# Patient Record
Sex: FEMALE | Race: ASIAN | NOT HISPANIC OR LATINO | Employment: FULL TIME | URBAN - METROPOLITAN AREA
[De-identification: names, ages, dates, MRNs, and addresses within clinical notes are randomized per-mention and may not be internally consistent; named-entity substitution may affect disease eponyms.]

---

## 2022-07-11 ENCOUNTER — TRANSFERRED RECORDS (OUTPATIENT)
Dept: HEALTH INFORMATION MANAGEMENT | Facility: CLINIC | Age: 32
End: 2022-07-11

## 2023-08-30 ENCOUNTER — TRANSFERRED RECORDS (OUTPATIENT)
Dept: HEALTH INFORMATION MANAGEMENT | Facility: CLINIC | Age: 33
End: 2023-08-30

## 2023-08-31 ENCOUNTER — TRANSFERRED RECORDS (OUTPATIENT)
Dept: HEALTH INFORMATION MANAGEMENT | Facility: CLINIC | Age: 33
End: 2023-08-31

## 2023-08-31 ENCOUNTER — MEDICAL CORRESPONDENCE (OUTPATIENT)
Dept: HEALTH INFORMATION MANAGEMENT | Facility: CLINIC | Age: 33
End: 2023-08-31

## 2023-09-01 ENCOUNTER — TRANSCRIBE ORDERS (OUTPATIENT)
Dept: OTHER | Age: 33
End: 2023-09-01

## 2023-09-01 ENCOUNTER — HOSPITAL ENCOUNTER (EMERGENCY)
Facility: CLINIC | Age: 33
Discharge: HOME | End: 2023-09-02
Attending: FAMILY MEDICINE | Admitting: FAMILY MEDICINE
Payer: COMMERCIAL

## 2023-09-01 DIAGNOSIS — L27.0 ALLERGIC DRUG RASH: Primary | ICD-10-CM

## 2023-09-01 DIAGNOSIS — L50.9 URTICARIA: ICD-10-CM

## 2023-09-01 PROCEDURE — 80076 HEPATIC FUNCTION PANEL: CPT | Performed by: FAMILY MEDICINE

## 2023-09-01 PROCEDURE — 250N000009 HC RX 250: Performed by: FAMILY MEDICINE

## 2023-09-01 PROCEDURE — 99285 EMERGENCY DEPT VISIT HI MDM: CPT | Mod: 25

## 2023-09-01 PROCEDURE — 86140 C-REACTIVE PROTEIN: CPT | Performed by: FAMILY MEDICINE

## 2023-09-01 PROCEDURE — 250N000011 HC RX IP 250 OP 636: Mod: JZ | Performed by: FAMILY MEDICINE

## 2023-09-01 PROCEDURE — 36415 COLL VENOUS BLD VENIPUNCTURE: CPT | Performed by: FAMILY MEDICINE

## 2023-09-01 PROCEDURE — 96374 THER/PROPH/DIAG INJ IV PUSH: CPT

## 2023-09-01 RX ADMIN — EPINEPHRINE 0.3 MG: 1 INJECTION INTRAMUSCULAR; INTRAVENOUS; SUBCUTANEOUS at 23:34

## 2023-09-01 RX ADMIN — FAMOTIDINE 20 MG: 10 INJECTION INTRAVENOUS at 23:42

## 2023-09-01 ASSESSMENT — ENCOUNTER SYMPTOMS
COUGH: 0
COLOR CHANGE: 1
VOMITING: 0
NAUSEA: 0
ABDOMINAL PAIN: 0

## 2023-09-01 ASSESSMENT — ACTIVITIES OF DAILY LIVING (ADL): ADLS_ACUITY_SCORE: 35

## 2023-09-02 VITALS
WEIGHT: 102 LBS | SYSTOLIC BLOOD PRESSURE: 104 MMHG | TEMPERATURE: 98.5 F | HEIGHT: 61 IN | RESPIRATION RATE: 20 BRPM | BODY MASS INDEX: 19.26 KG/M2 | HEART RATE: 80 BPM | DIASTOLIC BLOOD PRESSURE: 59 MMHG | OXYGEN SATURATION: 97 %

## 2023-09-02 LAB
ALBUMIN SERPL-MCNC: 4.3 G/DL (ref 3.5–5)
ALP SERPL-CCNC: 65 U/L (ref 45–120)
ALT SERPL W P-5'-P-CCNC: 31 U/L (ref 0–45)
AST SERPL W P-5'-P-CCNC: 20 U/L (ref 0–40)
BILIRUB DIRECT SERPL-MCNC: 0.1 MG/DL
BILIRUB SERPL-MCNC: 0.5 MG/DL (ref 0–1)
C REACTIVE PROTEIN LHE: 0.8 MG/DL (ref 0–?)
PROT SERPL-MCNC: 8.7 G/DL (ref 6–8)

## 2023-09-02 RX ORDER — PREDNISONE 10 MG/1
TABLET ORAL
Qty: 16 TABLET | Refills: 0 | Status: SHIPPED | OUTPATIENT
Start: 2023-09-01 | End: 2023-09-15

## 2023-09-02 NOTE — DISCHARGE INSTRUCTIONS
Take cetirizine 10 mg twice a day    Increase prednisone taper to 50 mg daily for 2 days, then 30 mg daily for 3 days, then 20 mg daily for 3 days, then 10 mg daily for 3 days, then 5 mg daily for 2 days.  A new prescription will be sent to your pharmacy.    Start taking hydroxyzine 25gm or Benadryl 50mg every 6 hours for the next 24 hours and then as needed

## 2023-09-02 NOTE — ED TRIAGE NOTES
Pt has had rash with itching over most of her body. Symptoms became severe after a hepatitis vaccine she received on Tuesday. Was given Rx for hydroxyzine and prednisone which did not help much. Seen in clinic yesterday and given IM solu-medrol and started on cetirizine. Pt reports rash to face became worse again after dinner tonight. Has instructions to follow up with allergist, but has not been able to be seen in clinic yet.     Triage Assessment       Row Name 09/01/23 3189       Triage Assessment (Adult)    Airway WDL WDL       Respiratory WDL    Respiratory WDL WDL       Skin Circulation/Temperature WDL    Skin Circulation/Temperature WDL WDL       Cardiac WDL    Cardiac WDL WDL       Peripheral/Neurovascular WDL    Peripheral Neurovascular WDL WDL       Cognitive/Neuro/Behavioral WDL    Cognitive/Neuro/Behavioral WDL WDL

## 2023-09-02 NOTE — ED PROVIDER NOTES
EMERGENCY DEPARTMENT ENCOUNTER      NAME: Sandi High  AGE: 33 year old female  YOB: 1990  MRN: [id number]  EVALUATION DATE & TIME: 9/1/2023 10:56 PM    PCP: No primary care provider on file.    ED PROVIDER: Juan A Nichols M.D.    Chief Complaint   Patient presents with    Allergic Reaction       FINAL IMPRESSION:  1. Urticaria        ED COURSE & MEDICAL DECISION MAKING:    Pertinent Labs & Imaging studies independently interpreted by me. (See chart for details)  11:04 PM  Patient seen and examined, reviewed prior record from August 30 when patient was seen with similar symptoms, at that time and was prescribed hydroxyzine as well as prednisone 12-day taper, represented at that facility yesterday and cetirizine was added to prednisone and hydroxyzine.  Patient represents today with return of the rash.  On exam she has an erythematous and urticarial rash of the face and upper chest, no breathing difficulties, denies lip or tongue swelling.  No abdominal pain, nausea, or vomiting.  Patient was given Pepcid and epinephrine in the emergency department and plan on increasing prednisone dose from 30 mg to 50 mg daily, as well as increasing cetirizine to twice a day from once a day.  A follow-up for allergist has already been placed.  If patient needs further emergency department care over the weekend, encouraged her to go to [facility] or other facility with dermatology and allergy available.  12:26 AM labs independently interpreted by me patient with slight elevation in CRP, normal hepatic panel.  Rechecked, rashes resolved and she is feeling better, stable for discharge.    At the conclusion of the encounter I discussed the results of all of the tests and the disposition. The questions were answered. The patient or family acknowledged understanding and was agreeable with the care plan.       Medical Decision Making    History:  Supplemental history from: Friend  External Record(s)  reviewed: Documented in chart, if applicable.    Work Up:  Chart documentation includes differential considered and any EKGs or imaging independently interpreted by provider, where specified.  In additional to work up documented, I considered the following work up: Documented in chart, if applicable.    External consultation:  Discussion of management with another provider: Documented in chart, if applicable    Complicating factors:  Care impacted by chronic illness: N/A  Care affected by social determinants of health: Access to Affordable Health Care    Disposition considerations: Discharge. I recommended the patient continue their current prescription strength medication(s): see ED course/MDM. I considered admission, but discharged patient after significant clinical improvement.      MEDICATIONS GIVEN IN THE EMERGENCY:  Medications   famotidine (PEPCID) injection 20 mg (20 mg Intravenous $Given 9/1/23 5532)   EPINEPHrine (ADRENALIN) kit 0.3 mg (0.3 mg Intramuscular $Given 9/1/23 9302)       NEW PRESCRIPTIONS STARTED AT TODAY'S ER VISIT  New Prescriptions    PREDNISONE (DELTASONE) 10 MG TABLET    Take 5 tablets (50 mg) by mouth daily for 3 days, THEN 3 tablets (30 mg) daily for 3 days, THEN 2 tablets (20 mg) daily for 3 days, THEN 1 tablet (10 mg) daily for 3 days, THEN 0.5 tablets (5 mg) daily for 2 days.       =================================================================    HPI    Patient information was obtained from: Patient      Sandi High is a 33 year old female with no pertinent history who presents to this ED via walk-in for evaluation of allergic reaction.    Patient reports that three days ago in the afternoon, she received a hepatitis vaccine. Around midnight, she developed red itchy rash all over her body. She states that she was evaluated for her symptoms then and was prescribed Hydroxyzine and Prednisone, which she has been taking without relief. Yesterday, she woke up and noticed her symptoms  "were worse so she went to Urgent Care, where she was given IM Solumedrol and started on Cetrizine. She states that the Solumedrol did provide mild relief. This morning when she woke up, she states that all of her symptoms returned. After dinner tonight, she developed worsening redness to her face and notes associating burning sensation. Patient denies any known triggers. She states that she did try sleeping on her chair last night for a couple hours and did not notice worsening symptoms, however did notice it when she moved to her bed. Otherwise, she denies any cough, vomiting, nausea, and abdominal pain. Patient denies any significant medical history including diabetes and hypertension. She does not take medications regularly. No previous surgeries. She does not smoke. No other complaints at this time.     Erythematous urticarial rash of face, neck, and upper chest       REVIEW OF SYSTEMS   Review of Systems   Respiratory:  Negative for cough.    Gastrointestinal:  Negative for abdominal pain, nausea and vomiting.   Skin:  Positive for color change and rash.   All other systems reviewed and are negative.   All other systems reviewed and negative    PAST MEDICAL HISTORY:  History reviewed. No pertinent past medical history.    PAST SURGICAL HISTORY:  History reviewed. No pertinent surgical history.    CURRENT MEDICATIONS:    No current facility-administered medications for this encounter.     Current Outpatient Medications   Medication    predniSONE (DELTASONE) 10 MG tablet       ALLERGIES:  No Known Allergies    FAMILY HISTORY:  History reviewed. No pertinent family history.    SOCIAL HISTORY:        VITALS:  /55   Pulse 78   Temp 98.5  F (36.9  C) (Oral)   Resp 20   Ht 1.549 m (5' 1\")   Wt 46.3 kg (102 lb)   SpO2 99%   BMI 19.27 kg/m      PHYSICAL EXAM:  Physical Exam  Vitals and nursing note reviewed.   Constitutional:       Appearance: Normal appearance.   HENT:      Head: Normocephalic and " atraumatic.      Right Ear: External ear normal.      Left Ear: External ear normal.      Nose: Nose normal.      Mouth/Throat:      Mouth: Mucous membranes are moist.   Eyes:      Extraocular Movements: Extraocular movements intact.      Conjunctiva/sclera: Conjunctivae normal.      Pupils: Pupils are equal, round, and reactive to light.   Cardiovascular:      Rate and Rhythm: Normal rate and regular rhythm.   Pulmonary:      Effort: Pulmonary effort is normal.      Breath sounds: Normal breath sounds. No wheezing or rales.   Abdominal:      General: Abdomen is flat. There is no distension.      Palpations: Abdomen is soft.      Tenderness: There is no abdominal tenderness. There is no guarding.   Musculoskeletal:         General: Normal range of motion.      Cervical back: Normal range of motion and neck supple.      Right lower leg: No edema.      Left lower leg: No edema.   Lymphadenopathy:      Cervical: No cervical adenopathy.   Skin:     General: Skin is warm and dry.      Comments: Erythematous urticarial rash of face, neck, and upper chest    Neurological:      General: No focal deficit present.      Mental Status: She is alert and oriented to person, place, and time. Mental status is at baseline.      Comments: No gross focal neurologic deficits   Psychiatric:         Mood and Affect: Mood normal.         Behavior: Behavior normal.         Thought Content: Thought content normal.         LAB:  All pertinent labs reviewed and interpreted.  Results for orders placed or performed during the hospital encounter of 09/01/23   CRP inflammation   Result Value Ref Range    CRP 0.8 (H) 0.0 - <0.8 mg/dL   Hepatic function panel   Result Value Ref Range    Bilirubin Total 0.5 0.0 - 1.0 mg/dL    Bilirubin Direct 0.1 <=0.5 mg/dL    Protein Total 8.7 (H) 6.0 - 8.0 g/dL    Albumin 4.3 3.5 - 5.0 g/dL    Alkaline Phosphatase 65 45 - 120 U/L    AST 20 0 - 40 U/L    ALT 31 0 - 45 U/L       RADIOLOGY:  Reviewed all pertinent  imaging. Please see official radiology report.  No orders to display       I, Sarah Horvath, am serving as a scribe to document services personally performed by Dr. Nichols based on my observation and the provider's statements to me. I, Juan A Nichols MD attest that Sarah Horvath is acting in a scribe capacity, has observed my performance of the services and has documented them in accordance with my direction.    Juan A Nichols M.D.  Emergency Medicine  [facility] EMERGENCY ROOM  [address] [place] [id number] [phone]  Dept: [phone]       Juan A Nichols MD  09/02/23 0027       Juan A Nichols MD  09/02/23 0031

## 2023-09-05 ENCOUNTER — TELEPHONE (OUTPATIENT)
Dept: DERMATOLOGY | Facility: CLINIC | Age: 33
End: 2023-09-05
Payer: COMMERCIAL

## 2023-09-05 NOTE — TELEPHONE ENCOUNTER
This encounter is being sent to inform the clinic that this patient has a referral from  for the diagnoses ofUrticaria [L50.9]  and has requested that this patient be seen within 3-5 days and/or with [facility]  Based on the availability of our provider(s), we are unable to accommodate this request.    Were all sites offered this patient?  Yes    Does scheduling algorithm request to schedule next available?  Patient has been scheduled for the first available opening with [facility] on 3/27.  We have informed the patient that the clinic will review their referral and reach out if a sooner appointment is medically necessary.

## 2023-09-11 ENCOUNTER — TELEPHONE (OUTPATIENT)
Dept: ALLERGY | Facility: CLINIC | Age: 33
End: 2023-09-11
Payer: COMMERCIAL

## 2023-09-11 NOTE — TELEPHONE ENCOUNTER
[facility] Call Center    Phone Message    May a detailed message be left on voicemail: yes     Reason for Call: Other: Pt states she is returning a call from 9/7 to schedule for an urgent 3 - 5 day referral. See TE dated 9/5/23.     Please call back at [phone]/ Thank you.     Action Taken: Message routed to:  [facility]): Allergy    Travel Screening: Not Applicable

## 2023-09-14 NOTE — TELEPHONE ENCOUNTER
Called and left message to assist with sooner appt. Also suggested signing up for OwlTing ???t to make communicating easier potentially. Number provided to call back

## 2023-09-16 NOTE — TELEPHONE ENCOUNTER
FUTURE VISIT INFORMATION      FUTURE VISIT INFORMATION:  Date: 9.26.23  Time: 7:30  Location: [facility]  REFERRAL INFORMATION:  Referring provider:  Simone  Referring providers clinic:  [facility]  Reason for visit/diagnosis  Urticaria [L50.9] / ref by Juan A Nichols / recs in [facility] / Urgent: 3-5 Days / per Pt.,      RECORDS REQUESTED FROM:       Clinic name Comments Records Status Imaging Status   [facility] 9.1.23  [facility]one [facility] 8.31.23  [facility]    8.30.23  Sherly Received  In [facility]

## 2023-09-26 ENCOUNTER — OFFICE VISIT (OUTPATIENT)
Dept: ALLERGY | Facility: CLINIC | Age: 33
End: 2023-09-26
Attending: FAMILY MEDICINE
Payer: COMMERCIAL

## 2023-09-26 ENCOUNTER — PRE VISIT (OUTPATIENT)
Dept: ALLERGY | Facility: CLINIC | Age: 33
End: 2023-09-26

## 2023-09-26 DIAGNOSIS — H10.10 SEASONAL AND PERENNIAL ALLERGIC RHINOCONJUNCTIVITIS: ICD-10-CM

## 2023-09-26 DIAGNOSIS — J30.89 SEASONAL AND PERENNIAL ALLERGIC RHINOCONJUNCTIVITIS: ICD-10-CM

## 2023-09-26 DIAGNOSIS — Z88.9 ATOPY: ICD-10-CM

## 2023-09-26 DIAGNOSIS — Z91.09 HOUSE DUST MITE ALLERGY: Primary | ICD-10-CM

## 2023-09-26 DIAGNOSIS — L50.9 URTICARIA: ICD-10-CM

## 2023-09-26 DIAGNOSIS — J30.2 SEASONAL AND PERENNIAL ALLERGIC RHINOCONJUNCTIVITIS: ICD-10-CM

## 2023-09-26 PROCEDURE — 99203 OFFICE O/P NEW LOW 30 MIN: CPT | Mod: 25 | Performed by: DERMATOLOGY

## 2023-09-26 PROCEDURE — 95004 PERQ TESTS W/ALRGNC XTRCS: CPT | Mod: GC | Performed by: DERMATOLOGY

## 2023-09-26 RX ORDER — FEXOFENADINE HCL 180 MG/1
180 TABLET ORAL DAILY
Qty: 60 TABLET | Refills: 2 | Status: SHIPPED | OUTPATIENT
Start: 2023-09-26 | End: 2024-03-08

## 2023-09-26 NOTE — PROGRESS NOTES
[facility] Dermato-allergology Note  Office visit  Encounter Date: Sep 26, 2023  ____________________________________________    CC: Allergy Consult (Per pt, intermittent full body hives starting around 8/23, may be correlated with conclusion of monthly menstrual cycle. Went to ER at last occurrence, per pt prescribed short-term cetirizine, prednisone and hydroxyzine which resolved issue, no longer taking.)      HPI:  (Sep 26, 2023)  Ms. Sandi High is a(n) 33 year old female who presents today as new patient for allergy consultation  - 8/29th received HepatitisA vaccine in the afternoon and starting getting the symptoms at midnight, itchy, red, goes to the face and legs it lasted for 1 week.  8/31- prescribed medication of prednisone, then [place] ER given IM injection of solumedrol and prednisone 30 mg hydroxyzine prn and cetirizine didn't help.  9/1 [facility]  went to ED - ER docs increased the dosage and didn't help and Epinephrine shots and symptoms started coming down (redness faded away)   9/5 - Symptoms disappeared.    Of note 8/20 - was in [place] and started to get hives on the legs and gotten Benadryl.   Redness came back and still feels pruritus on the body.     Did not take any NSAIDs, Aleve,       - otherwise feeling well in usual state of health    Physical exam:  General: In no acute distress, well-developed, well-nourished  Eyes: no conjunctivitis  ENT: no signs of rhinitis   Pulmonary: no wheezing or coughing  Skin: Focused examination of the skin on test sites was performed = see test results below  No evidence of disease on exam today.     Past Medical History:   There is no problem list on file for this patient.    History reviewed. No pertinent past medical history.    Allergies:  No Known Allergies    Medications:  Current Outpatient Medications   Medication    fexofenadine (ALLEGRA ALLERGY) 180 MG tablet     No current facility-administered medications for this  visit.       Social History:  The patient is a student. ph. d psychology student photography. Patient has the following hobbies or non-occupational exposure hiking, photography.     Family History:  History reviewed. No pertinent family history.    Previous Labs, Allergy Tests, Dermatopathology, Imaging:  ED notes 09/01/2023    Patient reports that three days ago in the afternoon, she received a hepatitis vaccine. Around midnight, she developed red itchy rash all over her body. She states that she was evaluated for her symptoms then and was prescribed Hydroxyzine and Prednisone, which she has been taking without relief. Yesterday, she woke up and noticed her symptoms were worse so she went to Urgent Care, where she was given IM Solumedrol and started on Cetrizine. She states that the Solumedrol did provide mild relief. This morning when she woke up, she states that all of her symptoms returned. After dinner tonight, she developed worsening redness to her face and notes associating burning sensation. Patient denies any known triggers. She states that she did try sleeping on her chair last night for a couple hours and did not notice worsening symptoms, however did notice it when she moved to her bed. Otherwise, she denies any cough, vomiting, nausea, and abdominal pain. Patient denies any significant medical history including diabetes and hypertension. She does not take medications regularly. No previous surgeries. She does not smoke. No other complaints at this time.      Erythematous urticarial rash of face, neck, and upper chest     Referred By: Juan A Nichols MD  [address] [place] [address]     Allergy Tests:    Past Allergy Test    Order for Future Allergy Testing:    [] Outpatient  [] Inpatient: [facility]..../ Bed ....       Skin Atopy (atopic dermatitis) [] Yes   [x] No .........  Contact allergies:   [] Yes   [x] No ..........  Hand eczema:   [] Yes   [x] No           Leading hand:   [] R   [] L        [] Ambidextrous         Drug allergies:        [] Yes   [x] No  which?......    Urticaria/Angioedema  [x] Yes   [] No .........  Food Allergy:  [] Yes   [x] No  which?......  Pets :  [x] Yes   [] No  which?......cats         [x]  Rhinitis   [x] Conjunctivitis   [] Sinusitis   [] Polyposis   [] Otitis   [] Pharyngitis         []  Postnasal drip    []  none  Operations:   [] Tonsils   [] Septum   [] Sinus   [] Polyposis        [] Asthma bronchiale   [] Coughing      []  none  Symptoms (mostly Rhinoconjunctivitis and Asthma) aggravated by:  Season   [] I   [x] II   [x] III   [] IV   []V   []VI   []VII   []VIII   []IX   []X   []XI   []XII     [] perennial   Day time      [] morning   [] noon      [] evening        [] night    [x] whole day........  []  none  Location/changes    [] inside        [x] outside   [] mountains    [] sea     [] others.............   []  none  Triggers, specific     [] animals     [x] plants     [] dust              [] others ...........................    []  none  Triggers, others       [] work          [] psyche    [] sport            [] others .............................  []  none  Irritant                [] phys efforts [] smoke    [] heat/cold     [] odors  []others............... []  none    Order for PATCH TESTS  Reason for tests (suspected allergy): not necessary   Known previous allergies: n/a  Standardized panels  [] Standard panel (40 tests)  [] Preservatives & Antimicrobials (31 tests)  [] Emulsifiers & Additives (25 tests)   [] Perfumes/Flavours & Plants (25 tests)  [] Hairdresser panel (12 tests)  [] Rubber Chemicals (22 tests)  [] Plastics (26 tests)  [] Colorants/Dyes/Food additives (20 tests)  [] Metals (implants/dental) (24 tests)  [] Local anaesthetics/NSAIDs (13 tests)  [] Antibiotics & Antimycotics (14 tests)   [] Corticosteroids (15 tests)   [] Photopatch test (62 tests)   [] others: ...      [] Patient's own products: ...  DO NOT test if chemical or biological identity  is unknown!   always ask from patient the product information and safety sheets (MSDS)       Order for PRICK TESTS    Reason for tests (suspected allergy): seasonal allergies  Known previous allergies: seasonal in spring in [place]    Standardized prick panels  [x] Atopic panel (20 tests)  [] Pediatric Panel (12 tests)  [] Milk, Meat, Eggs, Grains (20 tests)   [] Dust, Epithelia, Feathers (10 tests)  [] Fish, Seafood, Shellfish (17 tests)  [] Nuts, Beans (14 tests)  [] Spice, Vegetable, Fruit (17 tests)  [] Pollen Panel = Tree, Grass, Weed (24 tests)  [] Others: ...      [] Patient's own products: ...  DO NOT test if chemical or biological identity is unknown!   always ask from patient the product information and safety sheets (MSDS)     Standardized intradermal tests  [] Penicillium notatum [] Aspergillus fumigatus [] House dust mites D.far & D. pteron  [] Cat    [] dog  [] Others: ...  [] Bee venom   [] Wasp venom  !!Specific protocol with dilutions!!       Order for Drug allergy tests (prick & Intradermal &  patch tests)    [] Penicillin G  [] Ampicillin [] Cefazolin   [] Ceftriaxone   [] Ceftazidime  [] Bactrim    [] Others: ...  Order for ... as test date    Atopy Screen (Placed Sep 26, 2023)  No Substance Readings (15 min) Evaluation   POS Histamine 1mg/ml ++    NEG NaCl 0.9% -      No Substance Readings (15 min) Evaluation   1 Alternaria alternata (tenuis)  -    2 Cladosporium herbarum -    3 Aspergillus fumigatus -    4 Penicillium notatum -    5 Dermatophagoides pteronyssinus +/++    6 Dermatophagoides farinae +    7 Dog epithelium (canis spp) -    8 Cat hair (leno catus) -    9 Cockroach   (Blatella americana & germanica) +++    10 Grass mix midwest   (Perla, Orchard, Redtop, Júnior) -    11 James grass (sorghum halepense) -    12 Weed mix   (common Cocklebur, Lamb s quarters, rough redroot Pigweed, Dock/Sorrel) +    13 Mug wort (artemisia vulgare) -    14 Ragweed giant/short (ambrosia spp) -    15 White  birch (Betula papyrifera) -    16 Tree mix 1 (Pecan, Maple BHR, Oak RVW, american Sheldon, black Alva) -    17 Red cedar (juniperus virginia) -    18 Tree mix 2   (white Kaleb, river/red Birch, black [place], common [place], american Elm) -    19 Box elder/Maple mix (acer spp) -    20 [place] shagbark (carya ovata) -           Conclusion: dust mite and cockroach sensitization     ________________________________    Assessment & Plan:    ==> Final Diagnosis:     #Subacute urticaria s/p vaccine   No signs for specific allergy to the vaccine   More likely non-specific = para-inflammatory reaction  - Allegra 60 tablets refill x 2       #Suspicion for Atopic Predisposition   Seasonal Rhinoconjunctivitis in Spring in [place]  sensitization to house dust mites    These conclusions are made at the best of one's knowledge and belief based on the provided evidence such as patient's history and allergy test results and they can change over time or can be incomplete because of missing information's.    ==> Treatment Plan:  Atopic prick panel  Fexofenadine 180mg BID with instructions for pretreatment before vaccines    Procedures Performed: Allergy tests, including prick tests    Staff and Resident:  Provider    Eloy Jack DO, MS  PGY-2  Dermatology  [facility]  Sep 26, 2023 9:14 AM    Staff Physician Comments:  I saw and evaluated the patient with the resident and I agree with the assessment and plan as documented in the resident's note.    Cisco Merida MD  Professor  Head of Dermato-Allergy Division  Department of Dermatology  [facility]     Follow-up in Derm-Allergy clinic 3/27/24    I spent a total of 38 minutes with Sandi High. This time was spent counseling the patient and/or coordinating care, explaining the allergy tests, performing allergy tests and assessing the clinical relevance.

## 2023-09-26 NOTE — PATIENT INSTRUCTIONS
allegra 180 mg take 1-2 allegra a day for 1-2 weeks  If you get a vaccine again start allegra 2 days before the vaccine, during the vaccine administration, and after for 2 days.     House Dust Mite Allergy        The house dust mite is an arachnid about 0.3 mm in size and not visible to the naked eye. There are around 150 species of house dust mites in the world. One mite produces up to 40 fecal droppings a day. One teaspoonful of bedroom dust contains an average of nearly 1000 mites and 250,000 minute droppings.    Causes and triggers of house dust mite allergy  The house dust mite requires a warm, moist environment without light in order to live and reproduce. Our beds are ideal. The mite feeds on human and animal skin scales. The allergen is mainly contained in the mite's feces. The feces contain allergy-triggering constituents which are spread in fine dust, are breathed in and can cause an allergic reaction.    Symptoms  When the allergens come into contact with the mucous membranes in the eyes, nose, mouth and throat, sufferers develop symptoms typical of an allergic cold (allergic rhinitis) or an allergic inflammation of the conjunctiva (allergic conjunctivitis): blocked or runny nose, sneezing, red, itchy eyes. If all of these symptoms are present, then the condition is also known as rhinoconjunctivitis. Often, the upper respiratory tract becomes chronically inflamed, primarily because house dust mites are present all year round.  The symptoms of house dust mite allergy typically occur in the morning and are more frequent in the cold months of the year.    Therapy and treatment  As a first step, mattress, pillows and duvet/comforter should be placed in mite-proof or anti-mite covers, sometimes known as encasings. Alternatively you can use pillows or comforter that can be washed at over 130 F monthly. At the same time, house dust should be minimized. If necessary, the symptoms can be treated with medication,  "for example antihistamines in the form of nasal sprays, eye drops and tablets. Desensitization/specific immunotherapy (SIT) is recommended for house dust allergy if all the measures above are not sufficient.    Tips and tricks:  Keep room temperature at 66-70 F and relative air humidity at a maximum of 50%.  Ideally, thoroughly air your home two to three times a day for 5 to 10 minutes each time.  Wash bed linens in at least 130 F every week.  Remove stuffed animals or freeze them every other week.  Keep ceiling fans off in the bedroom as they can stir up dust mite allergens.  Remove dust from furniture with a damp cloth and regularly wet mop floors.  Do not put pot and hydroponic plants in the bedroom and also avoid putting too many in living areas, as they increase room humidity.  When staying overnight in other accommodations, we recommend taking your own bed linen and the above anti-mite mattress covers with you.  Remove upholstered furniture from the bedroom and consider removing the carpets. Ideally, use sealed parquet or laminate peggy, cork tiles or peggy made of wood, novilon or PVC.  Maybe additionally reduce dust mites in mattress, upholstery, or peggy using hot steam .      Modified from \"House Dust Mite Allergy\" by [employer] [facility].   "

## 2023-09-26 NOTE — NURSING NOTE
Chief Complaint   Patient presents with    Allergy Consult     Per pt, intermittent full body hives starting around 8/23, may be correlated with conclusion of monthly menstrual cycle. Went to ER at last occurrence, per pt prescribed short-term cetirizine, prednisone and hydroxyzine which resolved issue, no longer taking.    Brandon Mercedes, RN

## 2023-09-26 NOTE — LETTER
9/26/2023         RE: Sandi High  [address]  [facility] [address]        Dear Colleague,    Thank you for referring your patient, Sandi High, to the [facility] [place]. Please see a copy of my visit note below.    [facility] Dermato-allergology Note  Office visit  Encounter Date: Sep 26, 2023  ____________________________________________    CC: Allergy Consult (Per pt, intermittent full body hives starting around 8/23, may be correlated with conclusion of monthly menstrual cycle. Went to ER at last occurrence, per pt prescribed short-term cetirizine, prednisone and hydroxyzine which resolved issue, no longer taking.)      HPI:  (Sep 26, 2023)  Ms. Sandi High is a(n) 33 year old female who presents today as new patient for allergy consultation  - 8/29th received HepatitisA vaccine in the afternoon and starting getting the symptoms at midnight, itchy, red, goes to the face and legs it lasted for 1 week.  8/31- prescribed medication of prednisone, then [place] [facility] given IM injection of solumedrol and prednisone 30 mg hydroxyzine prn and cetirizine didn't help.  9/1 formerly [facility]  went to ED - ER docs increased the dosage and didn't help and Epinephrine shots and symptoms started coming down (redness faded away)   9/5 - Symptoms disappeared.    Of note 8/20 - was in [place] and started to get hives on the legs and gotten Benadryl.   Redness came back and still feels pruritus on the body.     Did not take any NSAIDs, Aleve,       - otherwise feeling well in usual state of health    Physical exam:  General: In no acute distress, well-developed, well-nourished  Eyes: no conjunctivitis  ENT: no signs of rhinitis   Pulmonary: no wheezing or coughing  Skin: Focused examination of the skin on test sites was performed = see test results below  No evidence of disease on exam today.     Past Medical History:   There is no problem list on file for this  patient.    History reviewed. No pertinent past medical history.    Allergies:  No Known Allergies    Medications:  Current Outpatient Medications   Medication     fexofenadine (ALLEGRA ALLERGY) 180 MG tablet     No current facility-administered medications for this visit.       Social History:  The patient is a student. ph. d psychology student photography. Patient has the following hobbies or non-occupational exposure hiking, photography.     Family History:  History reviewed. No pertinent family history.    Previous Labs, Allergy Tests, Dermatopathology, Imaging:  ED notes 09/01/2023    Patient reports that three days ago in the afternoon, she received a hepatitis vaccine. Around midnight, she developed red itchy rash all over her body. She states that she was evaluated for her symptoms then and was prescribed Hydroxyzine and Prednisone, which she has been taking without relief. Yesterday, she woke up and noticed her symptoms were worse so she went to Urgent Care, where she was given IM Solumedrol and started on Cetrizine. She states that the Solumedrol did provide mild relief. This morning when she woke up, she states that all of her symptoms returned. After dinner tonight, she developed worsening redness to her face and notes associating burning sensation. Patient denies any known triggers. She states that she did try sleeping on her chair last night for a couple hours and did not notice worsening symptoms, however did notice it when she moved to her bed. Otherwise, she denies any cough, vomiting, nausea, and abdominal pain. Patient denies any significant medical history including diabetes and hypertension. She does not take medications regularly. No previous surgeries. She does not smoke. No other complaints at this time.      Erythematous urticarial rash of face, neck, and upper chest     Referred By: Juan A Nichols MD  [address] [place] [address]     Allergy Tests:    Past Allergy Test    Order  for Future Allergy Testing:    [] Outpatient  [] Inpatient: [facility]..../ Bed ....       Skin Atopy (atopic dermatitis) [] Yes   [x] No .........  Contact allergies:   [] Yes   [x] No ..........  Hand eczema:   [] Yes   [x] No           Leading hand:   [] R   [] L       [] Ambidextrous         Drug allergies:        [] Yes   [x] No  which?......    Urticaria/Angioedema  [x] Yes   [] No .........  Food Allergy:  [] Yes   [x] No  which?......  Pets :  [x] Yes   [] No  which?......cats         [x]  Rhinitis   [x] Conjunctivitis   [] Sinusitis   [] Polyposis   [] Otitis   [] Pharyngitis         []  Postnasal drip    []  none  Operations:   [] Tonsils   [] Septum   [] Sinus   [] Polyposis        [] Asthma bronchiale   [] Coughing      []  none  Symptoms (mostly Rhinoconjunctivitis and Asthma) aggravated by:  Season   [] I   [x] II   [x] III   [] IV   []V   []VI   []VII   []VIII   []IX   []X   []XI   []XII     [] perennial   Day time      [] morning   [] noon      [] evening        [] night    [x] whole day........  []  none  Location/changes    [] inside        [x] outside   [] mountains    [] sea     [] others.............   []  none  Triggers, specific     [] animals     [x] plants     [] dust              [] others ...........................    []  none  Triggers, others       [] work          [] psyche    [] sport            [] others .............................  []  none  Irritant                [] phys efforts [] smoke    [] heat/cold     [] odors  []others............... []  none    Order for PATCH TESTS  Reason for tests (suspected allergy): not necessary   Known previous allergies: n/a  Standardized panels  [] Standard panel (40 tests)  [] Preservatives & Antimicrobials (31 tests)  [] Emulsifiers & Additives (25 tests)   [] Perfumes/Flavours & Plants (25 tests)  [] Hairdresser panel (12 tests)  [] Rubber Chemicals (22 tests)  [] Plastics (26 tests)  [] Colorants/Dyes/Food additives (20 tests)  [] Metals  (implants/dental) (24 tests)  [] Local anaesthetics/NSAIDs (13 tests)  [] Antibiotics & Antimycotics (14 tests)   [] Corticosteroids (15 tests)   [] Photopatch test (62 tests)   [] others: ...      [] Patient's own products: ...  DO NOT test if chemical or biological identity is unknown!   always ask from patient the product information and safety sheets (MSDS)       Order for PRICK TESTS    Reason for tests (suspected allergy): seasonal allergies  Known previous allergies: seasonal in spring in [place]    Standardized prick panels  [x] Atopic panel (20 tests)  [] Pediatric Panel (12 tests)  [] Milk, Meat, Eggs, Grains (20 tests)   [] Dust, Epithelia, Feathers (10 tests)  [] Fish, Seafood, Shellfish (17 tests)  [] Nuts, Beans (14 tests)  [] Spice, Vegetable, Fruit (17 tests)  [] Pollen Panel = Tree, Grass, Weed (24 tests)  [] Others: ...      [] Patient's own products: ...  DO NOT test if chemical or biological identity is unknown!   always ask from patient the product information and safety sheets (MSDS)     Standardized intradermal tests  [] Penicillium notatum [] Aspergillus fumigatus [] House dust mites D.far & D. pteron  [] Cat    [] dog  [] Others: ...  [] Bee venom   [] Wasp venom  !!Specific protocol with dilutions!!       Order for Drug allergy tests (prick & Intradermal &  patch tests)    [] Penicillin G  [] Ampicillin [] Cefazolin   [] Ceftriaxone   [] Ceftazidime  [] Bactrim    [] Others: ...  Order for ... as test date    Atopy Screen (Placed Sep 26, 2023)  No Substance Readings (15 min) Evaluation   POS Histamine 1mg/ml ++    NEG NaCl 0.9% -      No Substance Readings (15 min) Evaluation   1 Alternaria alternata (tenuis)  -    2 Cladosporium herbarum -    3 Aspergillus fumigatus -    4 Penicillium notatum -    5 Dermatophagoides pteronyssinus +/++    6 Dermatophagoides farinae +    7 Dog epithelium (canis spp) -    8 Cat hair (leno catus) -    9 Cockroach   (Blatella americana & germanica) +++    10 Grass  mix midwest   (Perla, Orchard, Redtop, Júnior) -    11 James grass (sorghum halepense) -    12 Weed mix   (common Cocklebur, Lamb s quarters, rough redroot Pigweed, Dock/Sorrel) +    13 Mug wort (artemisia vulgare) -    14 Ragweed giant/short (ambrosia spp) -    15 White birch (Betula papyrifera) -    16 Tree mix 1 (Pecan, Maple BHR, Oak RVW, american [place], black [place]) -    17 Red cedar (juniperus virginia) -    18 Tree mix 2   (white [place], river/red Birch, black [place], common [place], american Elm) -    19 Box elder/Maple mix (acer spp) -    20 Vining shagbark (carya ovata) -           Conclusion: dust mite and cockroach sensitization     ________________________________    Assessment & Plan:    ==> Final Diagnosis:     #Subacute urticaria s/p vaccine   No signs for specific allergy to the vaccine   More likely non-specific = para-inflammatory reaction  - Allegra 60 tablets refill x 2       #Suspicion for Atopic Predisposition   Seasonal Rhinoconjunctivitis in Spring in [place]  sensitization to house dust mites    These conclusions are made at the best of one's knowledge and belief based on the provided evidence such as patient's history and allergy test results and they can change over time or can be incomplete because of missing information's.    ==> Treatment Plan:  Atopic prick panel  Fexofenadine 180mg BID with instructions for pretreatment before vaccines    Procedures Performed: Allergy tests, including prick tests    Staff and Resident:  Provider    Eloy Jack DO, MS  PGY-2  Dermatology  [facility]  Sep 26, 2023 9:14 AM    Staff Physician Comments:  I saw and evaluated the patient with the resident and I agree with the assessment and plan as documented in the resident's note.    Cisco Merida MD  Professor  Head of Dermato-Allergy Division  Department of Dermatology  [facility]     Follow-up in Derm-Allergy clinic 3/27/24    I spent a total of  38 minutes with Sandi High. This time was spent counseling the patient and/or coordinating care, explaining the allergy tests, performing allergy tests and assessing the clinical relevance.         Again, thank you for allowing me to participate in the care of your patient.        Sincerely,        Cisco Merida MD

## 2023-09-26 NOTE — PROGRESS NOTES
[facility] Dermato-allergology Note  Office visit  Encounter Date: Sep 26, 2023  ____________________________________________    CC: No chief complaint on file.      HPI:  (Sep 26, 2023)  Ms. Sandi High is a(n) 33 year old female who presents today as new patient for allergy consultation  - ***  - otherwise feeling well in usual state of health    Physical exam:  General: In no acute distress, well-developed, well-nourished  Eyes: no conjunctivitis  ENT: no signs of rhinitis   Pulmonary: no wheezing or coughing  Skin: Focused examination of the skin on test sites was performed = see test results below  {Skin Exam:300430}    Past Medical History:   There is no problem list on file for this patient.    No past medical history on file.    Allergies:  No Known Allergies    Medications:  No current outpatient medications on file.     No current facility-administered medications for this visit.       Social History:  The patient {works:799578}. Patient has the following hobbies or non-occupational exposure ***    Family History:  No family history on file.    Previous Labs, Allergy Tests, Dermatopathology, Imaging:  ED notes 09/01/2023    Patient reports that three days ago in the afternoon, she received a hepatitis vaccine. Around midnight, she developed red itchy rash all over her body. She states that she was evaluated for her symptoms then and was prescribed Hydroxyzine and Prednisone, which she has been taking without relief. Yesterday, she woke up and noticed her symptoms were worse so she went to Urgent Care, where she was given IM Solumedrol and started on Cetrizine. She states that the Solumedrol did provide mild relief. This morning when she woke up, she states that all of her symptoms returned. After dinner tonight, she developed worsening redness to her face and notes associating burning sensation. Patient denies any known triggers. She states that she did try sleeping on her chair last  night for a couple hours and did not notice worsening symptoms, however did notice it when she moved to her bed. Otherwise, she denies any cough, vomiting, nausea, and abdominal pain. Patient denies any significant medical history including diabetes and hypertension. She does not take medications regularly. No previous surgeries. She does not smoke. No other complaints at this time.      Erythematous urticarial rash of face, neck, and upper chest       Referred By: Juan A Nichols MD  [address] [place] [address]     Allergy Tests:    Past Allergy Test    Order for Future Allergy Testing:    [] Outpatient  [] Inpatient: [facility]..../ Bed ....       Skin Atopy (atopic dermatitis) [] Yes   [] No .........  Contact allergies:   [] Yes   [] No ..........  Hand eczema:   [] Yes   [] No           Leading hand:   [] R   [] L       [] Ambidextrous         Drug allergies:        [] Yes   [] No  which?......    Urticaria/Angioedema  [] Yes   [] No .........  Food Allergy:  [] Yes   [] No  which?......  Pets :  [] Yes   [] No  which?......         []  Rhinitis   [] Conjunctivitis   [] Sinusitis   [] Polyposis   [] Otitis   [] Pharyngitis         []  Postnasal drip    []  none  Operations:   [] Tonsils   [] Septum   [] Sinus   [] Polyposis        [] Asthma bronchiale   [] Coughing      []  none  Symptoms (mostly Rhinoconjunctivitis and Asthma) aggravated by:  Season   [] I   [] II   [] III   [] IV   []V   []VI   []VII   []VIII   []IX   []X   []XI   []XII     [] perennial   Day time      [] morning   [] noon      [] evening        [] night    [] whole day........  []  none  Location/changes    [] inside        [] outside   [] mountains    [] sea     [] others.............   []  none  Triggers, specific     [] animals     [] plants     [] dust              [] others ...........................    []  none  Triggers, others       [] work          [] psyche    [] sport            [] others .............................  []   none  Irritant                [] phys efforts [] smoke    [] heat/cold     [] odors  []others............... []  none    Order for PATCH TESTS  Reason for tests (suspected allergy): ***  Known previous allergies: ***  Standardized panels  [] Standard panel (40 tests)  [] Preservatives & Antimicrobials (31 tests)  [] Emulsifiers & Additives (25 tests)   [] Perfumes/Flavours & Plants (25 tests)  [] Hairdresser panel (12 tests)  [] Rubber Chemicals (22 tests)  [] Plastics (26 tests)  [] Colorants/Dyes/Food additives (20 tests)  [] Metals (implants/dental) (24 tests)  [] Local anaesthetics/NSAIDs (13 tests)  [] Antibiotics & Antimycotics (14 tests)   [] Corticosteroids (15 tests)   [] Photopatch test (62 tests)   [] others: ...      [] Patient's own products: ...  DO NOT test if chemical or biological identity is unknown!   always ask from patient the product information and safety sheets (MSDS)       Order for PRICK TESTS    Reason for tests (suspected allergy): ***  Known previous allergies: ***    Standardized prick panels  [] Atopic panel (20 tests)  [] Pediatric Panel (12 tests)  [] Milk, Meat, Eggs, Grains (20 tests)   [] Dust, Epithelia, Feathers (10 tests)  [] Fish, Seafood, Shellfish (17 tests)  [] Nuts, Beans (14 tests)  [] Spice, Vegetable, Fruit (17 tests)  [] Pollen Panel = Tree, Grass, Weed (24 tests)  [] Others: ...      [] Patient's own products: ...  DO NOT test if chemical or biological identity is unknown!   always ask from patient the product information and safety sheets (MSDS)     Standardized intradermal tests  [] Penicillium notatum [] Aspergillus fumigatus [] House dust mites D.far & D. pteron  [] Cat    [] dog  [] Others: ...  [] Bee venom   [] Wasp venom  !!Specific protocol with dilutions!!       Order for Drug allergy tests (prick & Intradermal & *** patch tests)    [] Penicillin G  [] Ampicillin [] Cefazolin   [] Ceftriaxone   [] Ceftazidime  [] Bactrim    [] Others: ...  Order for ... as  test date    [] Patient needs consultation with Allergy team (changes of tests may apply)  [] Tests discussed with Allergy team (can have direct appointment for allergy tests)     ________________________________    Assessment & Plan:    ==> Final Diagnosis:     # ***  {jgstatus:366603}    # ***  {jgstatus:524592}    # ***  {jgstatus:990920}  {jgallergytestfinal:536572}  - ***    These conclusions are made at the best of one's knowledge and belief based on the provided evidence such as patient's history and allergy test results and they can change over time or can be incomplete because of missing information's.    ==> Treatment Plan:  ***    Procedures Performed: Allergy tests, including prick, intradermal and patch tests and drug allergy or provocation tests***    {kkstaffinvolved:777221} Provider    Follow-up in Derm-Allergy clinic ***    I spent a total of [5:10:15:20:25:30:35:40:45:50:55:60:***] minutes with Sandi High. This time was spent counseling the patient and/or coordinating care, explaining the allergy tests *** or procedures, performing allergy tests and assessing the clinical relevance.

## 2023-10-22 ENCOUNTER — HEALTH MAINTENANCE LETTER (OUTPATIENT)
Age: 33
End: 2023-10-22

## 2024-01-17 ENCOUNTER — ANCILLARY PROCEDURE (OUTPATIENT)
Dept: ULTRASOUND IMAGING | Facility: CLINIC | Age: 34
End: 2024-01-17
Attending: NURSE PRACTITIONER
Payer: COMMERCIAL

## 2024-01-17 DIAGNOSIS — T83.32XA IUD STRINGS LOST: ICD-10-CM

## 2024-01-17 PROCEDURE — 76856 US EXAM PELVIC COMPLETE: CPT | Mod: GC | Performed by: RADIOLOGY

## 2024-01-17 PROCEDURE — 76830 TRANSVAGINAL US NON-OB: CPT | Mod: GC | Performed by: RADIOLOGY

## 2024-03-08 ENCOUNTER — OFFICE VISIT (OUTPATIENT)
Dept: FAMILY MEDICINE | Facility: CLINIC | Age: 34
End: 2024-03-08
Payer: COMMERCIAL

## 2024-03-08 VITALS
OXYGEN SATURATION: 98 % | RESPIRATION RATE: 20 BRPM | SYSTOLIC BLOOD PRESSURE: 98 MMHG | HEART RATE: 74 BPM | BODY MASS INDEX: 20.46 KG/M2 | WEIGHT: 104.2 LBS | HEIGHT: 60 IN | DIASTOLIC BLOOD PRESSURE: 68 MMHG | TEMPERATURE: 99.3 F

## 2024-03-08 DIAGNOSIS — Z11.4 SCREENING FOR HIV (HUMAN IMMUNODEFICIENCY VIRUS): ICD-10-CM

## 2024-03-08 DIAGNOSIS — R10.2 SUPRAPUBIC PAIN: ICD-10-CM

## 2024-03-08 DIAGNOSIS — Z11.59 NEED FOR HEPATITIS C SCREENING TEST: ICD-10-CM

## 2024-03-08 DIAGNOSIS — Z97.5 IUD (INTRAUTERINE DEVICE) IN PLACE: ICD-10-CM

## 2024-03-08 DIAGNOSIS — Z12.4 CERVICAL CANCER SCREENING: ICD-10-CM

## 2024-03-08 DIAGNOSIS — R12 HEART BURN: ICD-10-CM

## 2024-03-08 DIAGNOSIS — R10.13 EPIGASTRIC PAIN: Primary | ICD-10-CM

## 2024-03-08 LAB
ALBUMIN UR-MCNC: NEGATIVE MG/DL
APPEARANCE UR: ABNORMAL
BACTERIA #/AREA URNS HPF: ABNORMAL /HPF
BASOPHILS # BLD AUTO: 0.1 10E3/UL (ref 0–0.2)
BASOPHILS NFR BLD AUTO: 1 %
BILIRUB UR QL STRIP: NEGATIVE
COLOR UR AUTO: YELLOW
EOSINOPHIL # BLD AUTO: 0.1 10E3/UL (ref 0–0.7)
EOSINOPHIL NFR BLD AUTO: 2 %
ERYTHROCYTE [DISTWIDTH] IN BLOOD BY AUTOMATED COUNT: 12.5 % (ref 10–15)
GLUCOSE UR STRIP-MCNC: NEGATIVE MG/DL
HCT VFR BLD AUTO: 38.8 % (ref 35–47)
HGB BLD-MCNC: 13.1 G/DL (ref 11.7–15.7)
HGB UR QL STRIP: ABNORMAL
IMM GRANULOCYTES # BLD: 0 10E3/UL
IMM GRANULOCYTES NFR BLD: 0 %
KETONES UR STRIP-MCNC: NEGATIVE MG/DL
LEUKOCYTE ESTERASE UR QL STRIP: NEGATIVE
LYMPHOCYTES # BLD AUTO: 1.5 10E3/UL (ref 0.8–5.3)
LYMPHOCYTES NFR BLD AUTO: 23 %
MCH RBC QN AUTO: 29.8 PG (ref 26.5–33)
MCHC RBC AUTO-ENTMCNC: 33.8 G/DL (ref 31.5–36.5)
MCV RBC AUTO: 88 FL (ref 78–100)
MONOCYTES # BLD AUTO: 0.7 10E3/UL (ref 0–1.3)
MONOCYTES NFR BLD AUTO: 10 %
NEUTROPHILS # BLD AUTO: 4.4 10E3/UL (ref 1.6–8.3)
NEUTROPHILS NFR BLD AUTO: 65 %
NITRATE UR QL: NEGATIVE
PH UR STRIP: 6 [PH] (ref 5–7)
PLATELET # BLD AUTO: 217 10E3/UL (ref 150–450)
RBC # BLD AUTO: 4.4 10E6/UL (ref 3.8–5.2)
RBC #/AREA URNS AUTO: ABNORMAL /HPF
SP GR UR STRIP: 1.02 (ref 1–1.03)
SQUAMOUS #/AREA URNS AUTO: ABNORMAL /LPF
UROBILINOGEN UR STRIP-ACNC: 0.2 E.U./DL
WBC # BLD AUTO: 6.8 10E3/UL (ref 4–11)
WBC #/AREA URNS AUTO: ABNORMAL /HPF

## 2024-03-08 PROCEDURE — 86364 TISS TRNSGLTMNASE EA IG CLAS: CPT | Performed by: FAMILY MEDICINE

## 2024-03-08 PROCEDURE — 36415 COLL VENOUS BLD VENIPUNCTURE: CPT | Performed by: FAMILY MEDICINE

## 2024-03-08 PROCEDURE — 99205 OFFICE O/P NEW HI 60 MIN: CPT | Performed by: FAMILY MEDICINE

## 2024-03-08 PROCEDURE — 84443 ASSAY THYROID STIM HORMONE: CPT | Performed by: FAMILY MEDICINE

## 2024-03-08 PROCEDURE — 81001 URINALYSIS AUTO W/SCOPE: CPT | Performed by: FAMILY MEDICINE

## 2024-03-08 PROCEDURE — 80053 COMPREHEN METABOLIC PANEL: CPT | Performed by: FAMILY MEDICINE

## 2024-03-08 PROCEDURE — 83690 ASSAY OF LIPASE: CPT | Performed by: FAMILY MEDICINE

## 2024-03-08 PROCEDURE — 86803 HEPATITIS C AB TEST: CPT | Performed by: FAMILY MEDICINE

## 2024-03-08 PROCEDURE — 85025 COMPLETE CBC W/AUTO DIFF WBC: CPT | Performed by: FAMILY MEDICINE

## 2024-03-08 PROCEDURE — 87389 HIV-1 AG W/HIV-1&-2 AB AG IA: CPT | Performed by: FAMILY MEDICINE

## 2024-03-08 RX ORDER — FAMOTIDINE 20 MG/1
20 TABLET, FILM COATED ORAL 2 TIMES DAILY
Qty: 28 TABLET | Refills: 0 | Status: SHIPPED | OUTPATIENT
Start: 2024-03-08 | End: 2024-03-22

## 2024-03-08 RX ORDER — PEDIATRIC MULTIVIT 61/D3/VIT K 1500-800
1 CAPSULE ORAL DAILY
COMMUNITY

## 2024-03-08 ASSESSMENT — PAIN SCALES - GENERAL: PAINLEVEL: MILD PAIN (3)

## 2024-03-08 NOTE — RESULT ENCOUNTER NOTE
Hello -    Here are my comments about your recent results:  -Normal red blood cell (hgb) levels, normal white blood cell count and normal platelet levels.  -Urine is normal.  No blood seen on microscopy and also no infection noted  For additional lab test information, labtestsonline.org is an excellent reference..    Please let us know if you have any questions or concerns.     Regards,  Kae Peck MD

## 2024-03-08 NOTE — PATIENT INSTRUCTIONS
In today for history of epigastric stomach pain and heartburn.  Has had intermittent stomach discomfort on and off in the past but been persistent for 1 week.  Did have an episode of heartburn that lasted the whole day 5 days ago that has improved with use of Tums.  Has already been avoiding caffeine, carbonated drinks ,spicy foods, & has reported no NSAIDs recently ( none in the week prior at least) and reports eating well before goes to bed and has had no increased stress or anxiety in life.  Symptoms suggestive of gastroesophageal reflux disease/heartburn.  Recommend frequent small meals, avoid eating 3 hours before bedtime, continue to avoid greasy overly fried foods, avoid spicy foods, caffeine carbonated drinks alcohol etc.  Try to eat a bland diet for few weeks.  Will prescribe Pepcid 20 mg twice a day for 2 weeks.  Will do lab work today CBC CMP lipase H. pylori stool test ordered and additional labs to make sure nothing else is going on.  [place] decision made to hold off on an ultrasound for gallstones at this time.  If symptoms do not improve with medication and dietary and lifestyle changes and lab work does not explain symptoms then recommend to see GI.  Referral in place.  May have to see them before getting an endoscopy to look into the stomach see what is going on.  If should have worsening pain to the point of vomiting or inability to tolerate anything by mouth blood in the stools or black stools to contact us right away and go to the ER.    Mild suprapubic pelvic cramping since had Mirena IUD placed in December is likely the body getting used to the IUD in place.  Pelvic ultrasound in January noted IUD in good position and had a normal benign looking left ovarian cyst.  May take Tylenol for pain and heating pad to area but avoid anti-inflammatories given the stomach for now.  Will check a UA to make sure there is no urine infection and this came back normal.    Return for preventive physical and  first time Pap smear to provider of choice at your convenience in the summer.  Bring in your immunization records so we can update your chart.  Currently it appears we may not have your Tdap or hepatitis B records or you might need vaccination if you do not have these records that you have had this in the past.  We could also check titers in the future to assess immunity.  Since drawing blood and due for HIV and hepatitis C as part of routine preventive care these were added to lab work today with your permission.  Contact us sooner for any concerns    The above note was dictated using voice recognition. Although reviewed after completion, some word and grammatical error may remain .

## 2024-03-08 NOTE — PROGRESS NOTES
The below note was dictated using voice recognition. Although reviewed after completion, some word and grammatical error may remain.    Assessment & Plan     Epigastric pain  Heart burn  In today for history of epigastric stomach pain and heartburn.  Has had intermittent stomach discomfort on and off in the past but been persistent for 1 week.  Did have an episode of heartburn that lasted the whole day 5 days ago that has improved with use of Tums.  Has already been avoiding caffeine, carbonated drinks ,spicy foods, & has reported no NSAID's recently ( none in the week prior at least) and reports eating well before goes to bed and has had no increased stress or anxiety in life.  Symptoms suggestive of gastroesophageal reflux disease/heartburn.  Recommend frequent small meals, avoid eating 3 hours before bedtime, continue to avoid greasy overly fried foods, avoid spicy foods, caffeine carbonated drinks alcohol etc.  Try to eat a bland diet for few weeks.  Will prescribe Pepcid 20 mg twice a day for 2 weeks.  Will do lab work today CBC CMP lipase H. pylori stool test ordered and additional labs to make sure nothing else is going on.  [place] decision made to hold off on an ultrasound for gallstones at this time.  If symptoms do not improve with medication and dietary and lifestyle changes and lab work does not explain symptoms then recommend to see GI.  Referral in place.  May have to see them before getting an endoscopy to look into the stomach see what is going on.  If should have worsening pain to the point of vomiting or inability to tolerate anything by mouth blood in the stools or black then to contact us right away and go to the ER.  Prelim labs showed Normal red blood cell (Hgb) levels, normal white blood cell count and normal platelet levels.    Mild suprapubic pelvic cramping since had Mirena IUD placed in December is likely the body getting used to the IUD in place.  Pelvic ultrasound in January noted IUD in  good position and had a normal benign looking left ovarian cyst.  May take Tylenol for pain and heating pad to area but avoid anti-inflammatories given the stomach for now.  Will check a UA to make sure there is no urine infection and this came back normal.-Urine is normal.  No blood seen on microscopy and also no infection noted    Encouraged to return for preventive physical and first time Pap smear to provider of choice at her convenience in the summer.  Encouraged to bring in he immunization records so we can update in epic  Currently it appears we may not have her Tdap or hepatitis B records or might need vaccination if never had these  We could also check titers in the future to assess immunity.  Since drawing blood and due for HIV and hepatitis C as part of routine preventive care these were added to lab work today with her  permission.  She is advised to contact us sooner for any concerns  - Adult GI  Referral - Consult Only; Future  - Tissue transglutaminase ilya IgA and IgG; Future  - CBC with Platelets & Differential; Future  - Comprehensive metabolic panel; Future  - TSH with free T4 reflex; Future  - Lipase; Future  - Helicobacter pylori Antigen Stool; Future  - famotidine (PEPCID) 20 MG tablet; Take 1 tablet (20 mg) by mouth 2 times daily for 14 days  - Tissue transglutaminase ilya IgA and IgG  - CBC with Platelets & Differential  - Comprehensive metabolic panel  - TSH with free T4 reflex  - Lipase  - Helicobacter pylori Antigen Stool    Suprapubic pain  IUD (intrauterine device) in place  Mild suprapubic pelvic cramping since had Mirena IUD placed in December is likely the body getting used to the IUD in place.  Pelvic ultrasound in January noted IUD in good position and had a normal benign looking left ovarian cyst.  May take Tylenol for pain and heating pad to area but avoid anti-inflammatories given the stomach for now.  Will check a UA to make sure there is no urine infection and this came  back normal.-Urine is normal.  No blood seen on microscopy and also no infection noted  - UA Macroscopic with reflex to Microscopic and Culture - Lab Collect; Future  - UA Macroscopic with reflex to Microscopic and Culture - Lab Collect  - UA Microscopic with Reflex to Culture    Cervical cancer screening  Encouraged to return for preventive physical and first time Pap smear to provider of choice at her convenience in the summer.  Encouraged to bring in her immunization records so we can update in epic  Currently it appears we may not have her Tdap or hepatitis B records or might need vaccination if never had these  We could also check titers in the future to assess immunity.  Since drawing blood and due for HIV and hepatitis C as part of routine preventive care these were added to lab work today with her  permission.  She is advised to contact us sooner for any concerns    Screening for HIV (human immunodeficiency virus)  - HIV Antigen Antibody Combo; Future  - HIV Antigen Antibody Combo    Need for hepatitis C screening test  - Hepatitis C Screen Reflex to HCV RNA Quant and Genotype; Future  - Hepatitis C Screen Reflex to HCV RNA Quant and Genotype    Review of the result(s) of each unique test - diagnostics in epic  Independent interpretation of a test performed by another physician/other qualified health care professional (not separately reported) - allergist  Diagnosis or treatment significantly limited by social determinants of health - new to this provider, limited records  Ordering of each unique test  Prescription drug management  60 minutes spent by me on the date of the encounter doing chart review, history and exam, documentation and further activities per the note    Regular exercise  See Patient Instructions        Jeff Junior is a 33 year old, presenting for the following health issues:  Abdominal Pain (Hurt burn- for the past few days )        3/8/2024     1:51 PM   Additional Questions    Roomed by Anne Marie GOODSON   Accompanied by self     History of Present Illness       Reason for visit:  I feel stomachache and heartburning  Symptom onset:  3-7 days ago  Symptoms include:  Stomachache and heartburning  Symptom intensity:  Moderate  Symptom progression:  Staying the same  Had these symptoms before:  No  What makes it worse:  Eating sour, spicy food  What makes it better:  Drinking milk, take Manpreet    She eats 0-1 servings of fruits and vegetables daily.She consumes 0 sweetened beverage(s) daily.She exercises with enough effort to increase her heart rate 9 or less minutes per day.  She exercises with enough effort to increase her heart rate 7 days per week.   She is taking medications regularly.     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  [place]  negative    Here today for acute concerns of stomach pain and heartburn.  New to this provider & clinic.  Limited records to review in epic.  Notes has always had a sensitive stomach like her mother and has had stomach ache off-and-on that comes and goes and usually because of that avoids caffeine, tea, saw spicy cold foods carbonated drinks alcohol as these can easily trigger pain.  Had a Mirena IUD placed December 2023 in [place] after became sexually active for the first time in September 2023 and was having pelvic cramps after for which an ultrasound was done in January 2024 which showed the IUD in place  and a left 4.5 cm physiologic cyst.  Was taking anti-inflammatories but nothing significant and had not taken any in the week prior to onset of current symptoms.  Usually when has a stomachache drinks milk and aloe juice and it goes away so has never seen a doctor for it.  Currently having epigastric pain that started 1 week ago Friday and it has been persistent since then which is unusual.  She has had no change in diet no recent travel no increased stress, is in the last 3 months of her PhD.  She is concerned about also.  5 days ago she had heartburn that started at night and went to 1 day resolved with taking Tums over-the-counter and has had no recurrence of the heartburn but the pain persists.  It was a first time experiencing heartburn.  Has been taking Tums about 5 times a day it gives transient relief of pain and is doing her lactose-free milk in addition to help feel better.  Reports no nausea vomiting diarrhea constipation has a bowel movement 1-2 a day grade 3-4 that is no different from before, has had no blood in the stools or black stools no weight loss or night sweats. Reports no fever or chills, no headache or dizziness, no double or blurry vision, no facial pain, earache, sore throat, runny nose, post nasal drip, no trouble hearing, smelling, tasting or swallowing, no cough, no chest pain, trouble breathing or palpitations. Reports no dysuria, hematuria, frequency, urgency, hesitancy, incontinence, No leg swelling or joint pain. No rash.  In recent past if having cramping in the pelvic area due to IUD was taking an ibuprofen with milk but has not had that in a while.  Reports no dramatic increase in stress is currently on her spring break.  Has not had any steroid meds recently.  Is not on any antihistamines once the urticaria last year resolved.  Usually eats around 6:30 PM.  1 week ago on Friday prior to onset of symptoms ate a little later at 7:40 PM but then went to bed at 11 PM.  There is no  "correlation if the pain occurs always after eating sometimes it occurs before dinner and she will take Tums before she eats food sometimes it will happen after a meal.  Seems to have more discomfort during the day than at night.  Pain does not keep her awake at night.  Other than the heartburn 1 day and night 5 days ago has not had recurrence of heartburn.  Drinks lactose-free milk.      Never had a pap smear  Sexually active since sept 2023  Had prior HPV vaccination  Agreeable to checking HIV and hepatitis C given getting lab work done today.  Will bring in immunization records when comes in for a physical.      Review of Systems  Constitutional, HEENT, cardiovascular, pulmonary, GI, , musculoskeletal, neuro, skin, endocrine and psych systems are negative, except as otherwise noted.      Objective    BP 98/68 (BP Location: Right arm, Patient Position: Sitting, Cuff Size: Adult Small)   Pulse 74   Temp 99.3  F (37.4  C) (Temporal)   Resp 20   Ht 1.527 m (5' 0.1\")   Wt 47.3 kg (104 lb 3.2 oz)   LMP 01/10/2024 (Approximate)   SpO2 98%   BMI 20.28 kg/m    Body mass index is 20.28 kg/m .  Physical Exam   GENERAL: alert and no distress  EYES: Eyes grossly normal to inspection, PERRL and conjunctivae and sclerae normal  HENT: ear canals and TM's normal, nose and mouth without ulcers or lesions  NECK: no adenopathy, no asymmetry, masses, or scars  RESP: lungs clear to auscultation - no rales, rhonchi or wheezes  CV: regular rate and rhythm, normal S1 S2, no S3 or S4, no murmur, click or rub, no peripheral edema  ABDOMEN: soft, non tender, no hepatosplenomegaly, no masses and bowel sounds normal  MS: no gross musculoskeletal defects noted, no edema  SKIN: no suspicious lesions or rashes  NEURO: Normal strength and tone, mentation intact and speech normal  PSYCH: mentation appears normal, affect normal/bright    Results for orders placed or performed in visit on 03/08/24   UA Macroscopic with reflex to Microscopic " and Culture - Lab Collect     Status: Abnormal    Specimen: Urine, Clean Catch   Result Value Ref Range    Color Urine Yellow Colorless, Straw, Light Yellow, Yellow    Appearance Urine Slightly Cloudy (A) Clear    Glucose Urine Negative Negative mg/dL    Bilirubin Urine Negative Negative    Ketones Urine Negative Negative mg/dL    Specific Gravity Urine 1.025 1.003 - 1.035    Blood Urine Trace (A) Negative    pH Urine 6.0 5.0 - 7.0    Protein Albumin Urine Negative Negative mg/dL    Urobilinogen Urine 0.2 0.2, 1.0 E.U./dL    Nitrite Urine Negative Negative    Leukocyte Esterase Urine Negative Negative   CBC with platelets and differential     Status: None   Result Value Ref Range    WBC Count 6.8 4.0 - 11.0 10e3/uL    RBC Count 4.40 3.80 - 5.20 10e6/uL    Hemoglobin 13.1 11.7 - 15.7 g/dL    Hematocrit 38.8 35.0 - 47.0 %    MCV 88 78 - 100 fL    MCH 29.8 26.5 - 33.0 pg    MCHC 33.8 31.5 - 36.5 g/dL    RDW 12.5 10.0 - 15.0 %    Platelet Count 217 150 - 450 10e3/uL    % Neutrophils 65 %    % Lymphocytes 23 %    % Monocytes 10 %    % Eosinophils 2 %    % Basophils 1 %    % Immature Granulocytes 0 %    Absolute Neutrophils 4.4 1.6 - 8.3 10e3/uL    Absolute Lymphocytes 1.5 0.8 - 5.3 10e3/uL    Absolute Monocytes 0.7 0.0 - 1.3 10e3/uL    Absolute Eosinophils 0.1 0.0 - 0.7 10e3/uL    Absolute Basophils 0.1 0.0 - 0.2 10e3/uL    Absolute Immature Granulocytes 0.0 <=0.4 10e3/uL   UA Microscopic with Reflex to Culture     Status: Abnormal   Result Value Ref Range    Bacteria Urine Few (A) None Seen /HPF    RBC Urine None Seen 0-2 /HPF /HPF    WBC Urine 0-5 0-5 /HPF /HPF    Squamous Epithelials Urine Moderate (A) None Seen /LPF    Narrative    Urine Culture not indicated   CBC with Platelets & Differential     Status: None    Narrative    The following orders were created for panel order CBC with Platelets & Differential.  Procedure                               Abnormality         Status                     ---------                                -----------         ------                     CBC with platelets and d...[[id number]]                      Final result                 Please view results for these tests on the individual orders.     Results for orders placed or performed in visit on 03/08/24 (from the past 24 hour(s))   UA Macroscopic with reflex to Microscopic and Culture - Lab Collect    Specimen: Urine, Clean Catch   Result Value Ref Range    Color Urine Yellow Colorless, Straw, Light Yellow, Yellow    Appearance Urine Slightly Cloudy (A) Clear    Glucose Urine Negative Negative mg/dL    Bilirubin Urine Negative Negative    Ketones Urine Negative Negative mg/dL    Specific Gravity Urine 1.025 1.003 - 1.035    Blood Urine Trace (A) Negative    pH Urine 6.0 5.0 - 7.0    Protein Albumin Urine Negative Negative mg/dL    Urobilinogen Urine 0.2 0.2, 1.0 E.U./dL    Nitrite Urine Negative Negative    Leukocyte Esterase Urine Negative Negative   CBC with Platelets & Differential    Narrative    The following orders were created for panel order CBC with Platelets & Differential.  Procedure                               Abnormality         Status                     ---------                               -----------         ------                     CBC with platelets and d...[[id number]]                      Final result                 Please view results for these tests on the individual orders.   CBC with platelets and differential   Result Value Ref Range    WBC Count 6.8 4.0 - 11.0 10e3/uL    RBC Count 4.40 3.80 - 5.20 10e6/uL    Hemoglobin 13.1 11.7 - 15.7 g/dL    Hematocrit 38.8 35.0 - 47.0 %    MCV 88 78 - 100 fL    MCH 29.8 26.5 - 33.0 pg    MCHC 33.8 31.5 - 36.5 g/dL    RDW 12.5 10.0 - 15.0 %    Platelet Count 217 150 - 450 10e3/uL    % Neutrophils 65 %    % Lymphocytes 23 %    % Monocytes 10 %    % Eosinophils 2 %    % Basophils 1 %    % Immature Granulocytes 0 %    Absolute Neutrophils 4.4 1.6 - 8.3 10e3/uL    Absolute  Lymphocytes 1.5 0.8 - 5.3 10e3/uL    Absolute Monocytes 0.7 0.0 - 1.3 10e3/uL    Absolute Eosinophils 0.1 0.0 - 0.7 10e3/uL    Absolute Basophils 0.1 0.0 - 0.2 10e3/uL    Absolute Immature Granulocytes 0.0 <=0.4 10e3/uL   UA Microscopic with Reflex to Culture   Result Value Ref Range    Bacteria Urine Few (A) None Seen /HPF    RBC Urine None Seen 0-2 /HPF /HPF    WBC Urine 0-5 0-5 /HPF /HPF    Squamous Epithelials Urine Moderate (A) None Seen /LPF    Narrative    Urine Culture not indicated           Signed Electronically by: Kae Peck MD

## 2024-03-09 LAB
ALBUMIN SERPL BCG-MCNC: 4.2 G/DL (ref 3.5–5.2)
ALP SERPL-CCNC: 54 U/L (ref 40–150)
ALT SERPL W P-5'-P-CCNC: 17 U/L (ref 0–50)
ANION GAP SERPL CALCULATED.3IONS-SCNC: 8 MMOL/L (ref 7–15)
AST SERPL W P-5'-P-CCNC: 15 U/L (ref 0–45)
BILIRUB SERPL-MCNC: 0.5 MG/DL
BUN SERPL-MCNC: 13.5 MG/DL (ref 6–20)
CALCIUM SERPL-MCNC: 8.9 MG/DL (ref 8.6–10)
CHLORIDE SERPL-SCNC: 102 MMOL/L (ref 98–107)
CREAT SERPL-MCNC: 0.64 MG/DL (ref 0.51–0.95)
DEPRECATED HCO3 PLAS-SCNC: 26 MMOL/L (ref 22–29)
EGFRCR SERPLBLD CKD-EPI 2021: >90 ML/MIN/1.73M2
GLUCOSE SERPL-MCNC: 93 MG/DL (ref 70–99)
HCV AB SERPL QL IA: NONREACTIVE
HIV 1+2 AB+HIV1 P24 AG SERPL QL IA: NONREACTIVE
LIPASE SERPL-CCNC: 62 U/L (ref 13–60)
POTASSIUM SERPL-SCNC: 4.1 MMOL/L (ref 3.4–5.3)
PROT SERPL-MCNC: 7.7 G/DL (ref 6.4–8.3)
SODIUM SERPL-SCNC: 136 MMOL/L (ref 135–145)
TSH SERPL DL<=0.005 MIU/L-ACNC: 2.02 UIU/ML (ref 0.3–4.2)

## 2024-03-09 PROCEDURE — 87338 HPYLORI STOOL AG IA: CPT | Performed by: FAMILY MEDICINE

## 2024-03-10 NOTE — RESULT ENCOUNTER NOTE
Hello -    Here are my comments about your recent results:  -Liver and gallbladder tests are normal (ALT,AST, Alk phos, bilirubin), kidney function is normal (Cr, GFR), sodium is normal, potassium is normal, calcium is normal, glucose is normal.  -TSH (thyroid stimulating hormone) level is normal which indicates normal thyroid function.  -Hepatitis C antibody screen test shows no signs of a previous hepatitis C infection.  -HIV test is normal.  - lipase a pancreatic enzyme is just 2 point above upper limit of normal and not considered to be clinically significant. Continue with current plan as discussed  If having worsening pain we can do imaging of your abdomen  For additional lab test information, labtestsonline.org is an excellent reference..    Please let us know if you have any questions or concerns.     Regards,  Kae Peck MD

## 2024-03-11 LAB
H PYLORI AG STL QL IA: NEGATIVE
TTG IGA SER-ACNC: 1 U/ML
TTG IGG SER-ACNC: <0.6 U/ML

## 2024-03-11 NOTE — RESULT ENCOUNTER NOTE
Hello -    Here are my comments about your recent results:  Testing negative for H Pylori bacteria    Please let us know if you have any questions or concerns.     Regards,  Kae Peck MD

## 2024-03-11 NOTE — RESULT ENCOUNTER NOTE
Hello -    Here are my comments about your recent results:  Negative for celiac    Please let us know if you have any questions or concerns.     Regards,  Kae Peck MD

## 2024-03-25 NOTE — TELEPHONE ENCOUNTER
REFERRAL INFORMATION:  Referring Provider:  Kae Peck MD  Referring Clinic:  [facility]  Reason for Visit/Diagnosis: R10.13 (ICD-10-CM) - Epigastric pain R12 (ICD-10-CM) - Heart burn     FUTURE VISIT INFORMATION:  Appointment Date: 3/29/24  Appointment Time: 7:00 AM     NOTES STATUS DETAILS   OFFICE NOTE from Referring Provider Internal 3/8/24 - [facility] with Kae Peck MD    MEDICATION LIST Internal         STOOL TESTING Internal H Pylori Stool - 3/9/24   PERTINENT LABS Internal 3/8/24 - Lipase; CMP; CBCPD; Tissue Transglutaminase

## 2024-03-28 ENCOUNTER — TELEPHONE (OUTPATIENT)
Dept: GASTROENTEROLOGY | Facility: CLINIC | Age: 34
End: 2024-03-28
Payer: COMMERCIAL

## 2024-03-28 NOTE — TELEPHONE ENCOUNTER
Left Voicemail (1st Attempt) and Sent Mychart (1st Attempt) to inform the patient of the following:    Appointment tomorrow at 7 am with Carola Irving has been changed to virtual as provider not feeling well.   Declines

## 2024-03-29 ENCOUNTER — PRE VISIT (OUTPATIENT)
Dept: GASTROENTEROLOGY | Facility: CLINIC | Age: 34
End: 2024-03-29

## 2024-03-29 ENCOUNTER — VIRTUAL VISIT (OUTPATIENT)
Dept: GASTROENTEROLOGY | Facility: CLINIC | Age: 34
End: 2024-03-29
Attending: FAMILY MEDICINE
Payer: COMMERCIAL

## 2024-03-29 DIAGNOSIS — R12 HEART BURN: ICD-10-CM

## 2024-03-29 DIAGNOSIS — R10.13 EPIGASTRIC PAIN: Primary | ICD-10-CM

## 2024-03-29 PROCEDURE — 99204 OFFICE O/P NEW MOD 45 MIN: CPT | Mod: 95 | Performed by: PHYSICIAN ASSISTANT

## 2024-03-29 RX ORDER — OMEPRAZOLE 40 MG/1
40 CAPSULE, DELAYED RELEASE ORAL DAILY
Qty: 90 CAPSULE | Refills: 0 | Status: SHIPPED | OUTPATIENT
Start: 2024-03-29 | End: 2024-06-24

## 2024-03-29 NOTE — PROGRESS NOTES
Joined the call at 3/29/2024, 7:01:20 am.  Left the call at 3/29/2024, 7:42:39 am.  You were on the call for 41 minutes 18 seconds .    GI CLINIC VISIT    Virtual Visit Details    Type of service:  Video Visit     Originating Location (pt. Location): Home in [place]    Distant Location (provider location):  Off-site  Platform used for Video Visit: Trang ABAD/REFERRING MD:  Kae Peck  REASON FOR CONSULTATION:   R10.13 (ICD-10-CM) - Epigastric pain   R12 (ICD-10-CM) - Heart burn     Chart Review  3/2024 -   H.pylori stool antigen NEG  TSH, CMP, CBC WNL  Lipase 62 (NL <60)  TTG Ab NEG     HPI  Sandi High is a 33 year old year old female with no significant PMHx  presenting to establish care with the [facility] GI group for epigastric abd pain.     1.  3 to 4 weeks ago started with epigastric pain and heartburn symptoms.  Pain is described as a sharp nonradiating sensation, rated 6/10 in severity. She was then given pepcid which she took for 14d with significant improvement in her symptoms, pain level going down to 2-4/10 and no longer having any heartburn.  Pain is now intermittent and does not last the entire day.  It typically follows 1 to 2 hours after dinner. Known trigger foods- spicy, sour foods  She has had intermittent epigastric discomfort since her 20s with sx resolution after drinking milk, most recently in past month, milk has not helped.   She did get an IUD in 12/2023 and up until Feb, she was taking mult doses of ibuprofen 2-400 mg 2-3x a week for pain relief. She is not taking NSAIDs now. No ETOH or smoking.   She is from [place]. There is no known fhx of gastric Ca or GI Ca.   No fever, chill, nausea/vomiting, dysphagia, odynphagia    2. BM - 1-2x/d, normal and formed. [place] 3-4. No blood or black in stools. Once every few weeks will have a bristol 1.     Weight - stable   Appetite - stable      Family Hx  Mom - with reflux troubles   No other known family history or GI related malignancy (esophageal, gastric,  pancreatic, liver or colon) or family history of IBD/celiac disease.     Social Hx   No current use of NSAIDs or Tylenol.     No ETOH  No tobacco products.   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    PERTINENT PAST MEDICAL HISTORY:  No past medical history on file.    PREVIOUS SURGERIES:  Past Surgical History:   Procedure Laterality Date     WISDOM TOOTH EXTRACTION      2 removed age 19, then 3rd age 22, 4th removed 2023       ALLERGIES:   No Known Allergies    PERTINENT MEDICATIONS:    Current Outpatient Medications:      mvw complete formulation (CHEWABLES) tablet, Take 1 tablet by mouth daily, Disp: , Rfl:     SOCIAL HISTORY:  Social History     Socioeconomic History     Marital status: Single     Spouse name: Not on file     Number of children: Not on file     Years of education: Not on file     Highest education level: Not on file   Occupational History     Not on file   Tobacco Use     Smoking status: Never     Smokeless tobacco: Never   Vaping Use     Vaping Use: Never used   Substance and Sexual Activity     Alcohol use: Never     Drug use: Never     Sexual activity: Yes     Birth control/protection: I.U.D.   Other Topics Concern     Not on file   Social History Narrative    Mar 2024: living with roommate,  on weekend with boyfriend    PHD student at the  , on precrastination     Social Determinants of Health     Financial Resource Strain: Not on file   Food Insecurity: Not on file   Transportation Needs: Not on file   Physical Activity: Not on file   Stress: Not on file   Social Connections: Not on file   Interpersonal Safety: Low Risk  (3/8/2024)    Interpersonal Safety      Do you feel physically and emotionally safe where you currently live?: Yes      Within the past 12 months, have you been hit, slapped, kicked or otherwise physically hurt by someone?: No       Within the past 12 months, have you been humiliated or emotionally abused in other ways by your partner or ex-partner?: No   Housing Stability: Not on file       FAMILY HISTORY:  Family History   Problem Relation Age of Onset     Gastrointestinal Disease Mother         sensitive stomach       Past/family/social history reviewed and no changes    PHYSICAL EXAMINATION:  Vitals reviewed: LMP 01/10/2024 (Approximate)   Wt:   Wt Readings from Last 2 Encounters:   03/08/24 47.3 kg (104 lb 3.2 oz)   09/01/23 46.3 kg (102 lb)      Video physical exam  General: Patient appears well in no acute distress.   Skin: No visualized rash or lesions on visualized skin  Eyes: EOMI, no erythema, sclera icterus or discharge noted  Resp: Appears to be breathing comfortably without accessory muscle usage, speaking in full sentences, no cough  MSK: Appears to have normal range of motion based on visualized movements  Neurologic: No apparent tremors, facial movements symmetric  Psych: affect normal, alert and oriented    PERTINENT STUDIES:    Office Visit on 03/08/2024   Component Date Value Ref Range Status     HIV Antigen Antibody Combo 03/08/2024 Nonreactive  Nonreactive Final     Hepatitis C Antibody 03/08/2024 Nonreactive  Nonreactive Final     Color Urine 03/08/2024 Yellow  Colorless, Straw, Light Yellow, Yellow Final     Appearance Urine 03/08/2024 Slightly Cloudy (A)  Clear Final     Glucose Urine 03/08/2024 Negative  Negative mg/dL Final     Bilirubin Urine 03/08/2024 Negative  Negative Final     Ketones Urine 03/08/2024 Negative  Negative mg/dL Final     Specific Gravity Urine 03/08/2024 1.025  1.003 - 1.035 Final     Blood Urine 03/08/2024 Trace (A)  Negative Final     pH Urine 03/08/2024 6.0  5.0 - 7.0 Final     Protein Albumin Urine 03/08/2024 Negative  Negative mg/dL Final     Urobilinogen Urine 03/08/2024 0.2  0.2, 1.0 E.U./dL Final     Nitrite Urine 03/08/2024 Negative  Negative Final     Leukocyte Esterase Urine 03/08/2024  Negative  Negative Final     Tissue Transglutaminase Antibody I* 03/08/2024 1.0  <7.0 U/mL Final     Tissue Transglutaminase Antibody I* 03/08/2024 <0.6  <7.0 U/mL Final     Sodium 03/08/2024 136  135 - 145 mmol/L Final     Potassium 03/08/2024 4.1  3.4 - 5.3 mmol/L Final     Carbon Dioxide (CO2) 03/08/2024 26  22 - 29 mmol/L Final     Anion Gap 03/08/2024 8  7 - 15 mmol/L Final     Urea Nitrogen 03/08/2024 13.5  6.0 - 20.0 mg/dL Final     Creatinine 03/08/2024 0.64  0.51 - 0.95 mg/dL Final     GFR Estimate 03/08/2024 >90  >60 mL/min/1.73m2 Final     Calcium 03/08/2024 8.9  8.6 - 10.0 mg/dL Final     Chloride 03/08/2024 102  98 - 107 mmol/L Final     Glucose 03/08/2024 93  70 - 99 mg/dL Final     Alkaline Phosphatase 03/08/2024 54  40 - 150 U/L Final     AST 03/08/2024 15  0 - 45 U/L Final     ALT 03/08/2024 17  0 - 50 U/L Final     Protein Total 03/08/2024 7.7  6.4 - 8.3 g/dL Final     Albumin 03/08/2024 4.2  3.5 - 5.2 g/dL Final     Bilirubin Total 03/08/2024 0.5  <=1.2 mg/dL Final     TSH 03/08/2024 2.02  0.30 - 4.20 uIU/mL Final     Lipase 03/08/2024 62 (H)  13 - 60 U/L Final     Helicobacter pylori Antigen Stool 03/09/2024 Negative  Negative Final     WBC Count 03/08/2024 6.8  4.0 - 11.0 10e3/uL Final     RBC Count 03/08/2024 4.40  3.80 - 5.20 10e6/uL Final     Hemoglobin 03/08/2024 13.1  11.7 - 15.7 g/dL Final     Hematocrit 03/08/2024 38.8  35.0 - 47.0 % Final     MCV 03/08/2024 88  78 - 100 fL Final     MCH 03/08/2024 29.8  26.5 - 33.0 pg Final     MCHC 03/08/2024 33.8  31.5 - 36.5 g/dL Final     RDW 03/08/2024 12.5  10.0 - 15.0 % Final     Platelet Count 03/08/2024 217  150 - 450 10e3/uL Final     % Neutrophils 03/08/2024 65  % Final     % Lymphocytes 03/08/2024 23  % Final     % Monocytes 03/08/2024 10  % Final     % Eosinophils 03/08/2024 2  % Final     % Basophils 03/08/2024 1  % Final     % Immature Granulocytes 03/08/2024 0  % Final     Absolute Neutrophils 03/08/2024 4.4  1.6 - 8.3 10e3/uL Final      Absolute Lymphocytes 03/08/2024 1.5  0.8 - 5.3 10e3/uL Final     Absolute Monocytes 03/08/2024 0.7  0.0 - 1.3 10e3/uL Final     Absolute Eosinophils 03/08/2024 0.1  0.0 - 0.7 10e3/uL Final     Absolute Basophils 03/08/2024 0.1  0.0 - 0.2 10e3/uL Final     Absolute Immature Granulocytes 03/08/2024 0.0  <=0.4 10e3/uL Final     Bacteria Urine 03/08/2024 Few (A)  None Seen /HPF Final     RBC Urine 03/08/2024 None Seen  0-2 /HPF /HPF Final     WBC Urine 03/08/2024 0-5  0-5 /HPF /HPF Final     Squamous Epithelials Urine 03/08/2024 Moderate (A)  None Seen /LPF Final        Lab Results   Component Value Date    WBC 6.8 03/08/2024    HGB 13.1 03/08/2024     03/08/2024    ALT 17 03/08/2024    ALT 31 09/01/2023    AST 15 03/08/2024    AST 20 09/01/2023     03/08/2024    BUN 13.5 03/08/2024    CO2 26 03/08/2024    TSH 2.02 03/08/2024        Liver Function Studies -   Recent Labs   Lab Test 03/08/24  1516   PROTTOTAL 7.7   ALBUMIN 4.2   BILITOTAL 0.5   ALKPHOS 54   AST 15   ALT 17        PREVIOUS ENDOSCOPY    No results found for this or any previous visit.    ASSESSMENT/PLAN:  Sandi High is a 33 year old year old female with no significant PMHx  presenting to establish care with the [facility] GI group for epigastric abd pain. Her sx improved significantly with 14d of pepcid. she was taking NSAIDs mult times a week but has since stopped in early March. Weight and appetite are stable. Never smoker.     3/2024 -   H.pylori stool antigen NEG  TSH, CMP, CBC WNL  Lipase 62 (NL <60)  TTG Ab NEG     #epigastric abd pain  #heartburn   Sx suggestive of GERD and gastritis (NSAID induced in particular). DDX includes PUD, hepatobiliary disease, pancreatic disease, functional disease vs other     -trend lipase and check amylase. Recent CMP OK  -order RUQ US   -start omeprazole 40 mg daily x 3 mo   -I asked she send mychart to me in 3-4 weeks on sx, sooner if needed. If sx still significant, will consider EGD  -advised cont to avoid  NSAIDs   -anti-reflux recs per AVS   -ER precautions reviewed for s/sx of pancreatitis and or worsening sx which we discussed today   -she is agreeable w/ plan as documented      Future consideration  1.EGD as per above     Orders Placed This Encounter   Procedures     US Abdomen Limited     IgA     Lipase     Amylase     Colorectal cancer screening: aged 45, unless otherwise clinically indicated     RTC - 3 mo or PRN     Thank you for this consultation.  It was a pleasure to participate in the care of this patient; please contact me with any further questions.     Carola Irving PA-C    Code per complexity

## 2024-03-29 NOTE — PATIENT INSTRUCTIONS
It was a pleasure taking care of you today.  I've included a brief summary of our discussion and care plan from today's visit below.  Please review this information with your primary care provider.  _______________________________________________________________________    My recommendations are summarized as follows:    Labs ordered - please call  [phone] to schedule at any [facility] location   Ultrasound ordered - please call [phone] to schedule   Starting you on a new medication - take it once daily 30-60 min in the morning before breakfast for a total of 3 months  Send an update in 3-4 weeks on how you're doing  (sooner if symptoms get worse before then)   See below (bottom of page) recommendations for diet and lifestyle that may help with GERD   Continue to avoid ibuprofen/NSAIDs    Please call our clinic or send a GI-Viewhart message to us if you have any questions or concerns. GI-Viewhart messages are answered by your nurse or doctor typically within 24 hours.  Please allow extra time on weekends and holidays    Return to GI Clinic in 3 months to review your progress.    _______________________________________________________________________    How do I schedule labs, imaging studies, or procedures that were ordered in clinic today?      Labs: To schedule lab appointment at the [facility], use my chart or call [phone]. If you have a [facility] lab closer to home where you are regularly seen you can give them a call.      Procedures: If a colonoscopy, upper endoscopy, breath test, esophageal manometry, or pH impedence was ordered today, our endoscopy team will call you to schedule this. If you have not heard from our endoscopy team within a week, please call [phone] option 2 to schedule.      Imaging Studies: If you were scheduled for a CT scan, X-ray, MRI, ultrasound, HIDA scan or other imaging study, please call [phone] to have this scheduled.      Referral: If a  referral to another specialty was ordered, expect a phone call or follow instructions above. If you have not heard from anyone regarding your referral in a week, please call our clinic to check the status.      Who do I call with any questions after my visit?  Please be in touch if there are any further questions that arise following today's visit.  There are multiple ways to contact your gastroenterology care team.       During business hours, you may reach a Gastroenterology nurse at [phone]     To schedule or reschedule an appointment, please call [phone].      You can always send a secure message through [facility].  [facility] messages are answered by your nurse or doctor typically within 24 hours.  Please allow extra time on weekends and holidays.       For urgent/emergent questions after business hours, you may reach the on-call GI Fellow by contacting the [facility]  at [phone].     How will I get the results of any tests ordered?    You will receive all of your results.  If you have signed up for [facility], any tests ordered at your visit will be available to you after your physician reviews them.  Typically this takes 1-2 weeks.  If there are urgent results that require a change in your care plan, your physician or nurse will call you to discuss the next steps.       What is [facility]?  [facility] is a secure way for you to access all of your healthcare records from the [facility].  It is a web based computer program, so you can sign on to it from any location.  It also allows you to send secure messages to your care team.  I recommend signing up for [facility] access if you have not already done so and are comfortable with using a computer.       How to I schedule a follow-up visit?  If you did not schedule a follow-up visit today, please call [phone] to schedule a follow-up office visit.      Sincerely,    Carola Irving PA-C  Gastroenterology    ---  Lifestyle  modifications for gastroesophageal reflux disease (GERD).     1. Change your eating habits.  -- It's best to eat several small meals instead of two or three large meals.  -- After you eat, wait 2 to 3 hours before you lie down. Late-night snacks aren't a good idea.  -- Chocolate, mint, and alcohol can make GERD worse. They relax the valve between the esophagus and the stomach.  -- Spicy foods, fatty foods, foods that have a lot of acid (like tomatoes and oranges), and coffee can make GERD symptoms worse in some people. If your symptoms are worse after you eat a certain food, you may want to stop eating that food to see if your symptoms get better.    2. Do not smoke or chew tobacco.    3. If you have GERD symptoms at night, raise the head of your bed 6 in. (15 cm) to 8 in. (20 cm) by putting the frame on blocks or placing a foam wedge under the head of your mattress. (Adding extra pillows does not work.)    4. Avoid or reduce pressure on your stomach. Don't wear tight clothing around your middle.    5. Lose weight if you need to. Losing just 5 to 10 pounds can help.

## 2024-03-29 NOTE — NURSING NOTE
Is the patient currently in the state of [place]? YES    Visit mode:VIDEO    If the visit is dropped, the patient can be reconnected by: VIDEO VISIT: Send to e-mail at: [email or web address]    Will anyone else be joining the visit? NO  (If patient encounters technical issues they should call [phone]    How would you like to obtain your AVS? MyChart    Are changes needed to the allergy or medication list? No    Reason for visit: Consult    Kush BRUNSON

## 2024-03-29 NOTE — LETTER
3/29/2024         RE: Sandi High  [address]  [facility] [address]        Dear Colleague,    Thank you for referring your patient, Sandi High, to the [facility] [place]. Please see a copy of my visit note below.    Joined the call at 3/29/2024, 7:01:20 am.  Left the call at 3/29/2024, 7:42:39 am.  You were on the call for 41 minutes 18 seconds .    GI CLINIC VISIT    Virtual Visit Details    Type of service:  Video Visit     Originating Location (pt. Location): Home in [place]    Distant Location (provider location):  Off-site  Platform used for Video Visit: Trang ABAD/REFERRING MD:  Kae Peck  REASON FOR CONSULTATION:   R10.13 (ICD-10-CM) - Epigastric pain   R12 (ICD-10-CM) - Heart burn     Chart Review  3/2024 -   H.pylori stool antigen NEG  TSH, CMP, CBC WNL  Lipase 62 (NL <60)  TTG Ab NEG     HPI  Sandi High is a 33 year old year old female with no significant PMHx  presenting to establish care with the [facility] GI group for epigastric abd pain.     1.  3 to 4 weeks ago started with epigastric pain and heartburn symptoms.  Pain is described as a sharp nonradiating sensation, rated 6/10 in severity. She was then given pepcid which she took for 14d with significant improvement in her symptoms, pain level going down to 2-4/10 and no longer having any heartburn.  Pain is now intermittent and does not last the entire day.  It typically follows 1 to 2 hours after dinner. Known trigger foods- spicy, sour foods  She has had intermittent epigastric discomfort since her 20s with sx resolution after drinking milk, most recently in past month, milk has not helped.   She did get an IUD in 12/2023 and up until Feb, she was taking mult doses of ibuprofen 2-400 mg 2-3x a week for pain relief. She is not taking NSAIDs now. No ETOH or smoking.   She is from [place]. There is no known fhx of gastric Ca or GI Ca.   No fever, chill, nausea/vomiting, dysphagia, odynphagia    2. BM - 1-2x/d,  normal and formed. Trinity 3-4. No blood or black in stools. Once every few weeks will have a bristol 1.     Weight - stable   Appetite - stable      Family Hx  Mom - with reflux troubles   No other known family history or GI related malignancy (esophageal, gastric, pancreatic, liver or colon) or family history of IBD/celiac disease.     Social Hx   No current use of NSAIDs or Tylenol.     No ETOH  No tobacco products.   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    PERTINENT PAST MEDICAL HISTORY:  No past medical history on file.    PREVIOUS SURGERIES:  Past Surgical History:   Procedure Laterality Date    WISDOM TOOTH EXTRACTION      2 removed age 19, then 3rd age 22, 4th removed 2023       ALLERGIES:   No Known Allergies    PERTINENT MEDICATIONS:    Current Outpatient Medications:     mvw complete formulation (CHEWABLES) tablet, Take 1 tablet by mouth daily, Disp: , Rfl:     SOCIAL HISTORY:  Social History     Socioeconomic History    Marital status: Single     Spouse name: Not on file    Number of children: Not on file    Years of education: Not on file    Highest education level: Not on file   Occupational History    Not on file   Tobacco Use    Smoking status: Never    Smokeless tobacco: Never   Vaping Use    Vaping Use: Never used   Substance and Sexual Activity    Alcohol use: Never    Drug use: Never    Sexual activity: Yes     Birth control/protection: I.U.D.   Other Topics Concern    Not on file   Social History Narrative    Mar 2024: living with roommate,  on weekend with boyfriend    PHD student at the  , on precrastination     Social Determinants of Health     Financial Resource Strain: Not on file   Food Insecurity: Not on file   Transportation Needs: Not on file   Physical Activity: Not on file   Stress: Not on file   Social Connections: Not on file    Interpersonal Safety: Low Risk  (3/8/2024)    Interpersonal Safety     Do you feel physically and emotionally safe where you currently live?: Yes     Within the past 12 months, have you been hit, slapped, kicked or otherwise physically hurt by someone?: No     Within the past 12 months, have you been humiliated or emotionally abused in other ways by your partner or ex-partner?: No   Housing Stability: Not on file       FAMILY HISTORY:  Family History   Problem Relation Age of Onset    Gastrointestinal Disease Mother         sensitive stomach       Past/family/social history reviewed and no changes    PHYSICAL EXAMINATION:  Vitals reviewed: LMP 01/10/2024 (Approximate)   Wt:   Wt Readings from Last 2 Encounters:   03/08/24 47.3 kg (104 lb 3.2 oz)   09/01/23 46.3 kg (102 lb)      Video physical exam  General: Patient appears well in no acute distress.   Skin: No visualized rash or lesions on visualized skin  Eyes: EOMI, no erythema, sclera icterus or discharge noted  Resp: Appears to be breathing comfortably without accessory muscle usage, speaking in full sentences, no cough  MSK: Appears to have normal range of motion based on visualized movements  Neurologic: No apparent tremors, facial movements symmetric  Psych: affect normal, alert and oriented    PERTINENT STUDIES:    Office Visit on 03/08/2024   Component Date Value Ref Range Status    HIV Antigen Antibody Combo 03/08/2024 Nonreactive  Nonreactive Final    Hepatitis C Antibody 03/08/2024 Nonreactive  Nonreactive Final    Color Urine 03/08/2024 Yellow  Colorless, Straw, Light Yellow, Yellow Final    Appearance Urine 03/08/2024 Slightly Cloudy (A)  Clear Final    Glucose Urine 03/08/2024 Negative  Negative mg/dL Final    Bilirubin Urine 03/08/2024 Negative  Negative Final    Ketones Urine 03/08/2024 Negative  Negative mg/dL Final    Specific Gravity Urine 03/08/2024 1.025  1.003 - 1.035 Final    Blood Urine 03/08/2024 Trace (A)  Negative Final    pH Urine  03/08/2024 6.0  5.0 - 7.0 Final    Protein Albumin Urine 03/08/2024 Negative  Negative mg/dL Final    Urobilinogen Urine 03/08/2024 0.2  0.2, 1.0 E.U./dL Final    Nitrite Urine 03/08/2024 Negative  Negative Final    Leukocyte Esterase Urine 03/08/2024 Negative  Negative Final    Tissue Transglutaminase Antibody I* 03/08/2024 1.0  <7.0 U/mL Final    Tissue Transglutaminase Antibody I* 03/08/2024 <0.6  <7.0 U/mL Final    Sodium 03/08/2024 136  135 - 145 mmol/L Final    Potassium 03/08/2024 4.1  3.4 - 5.3 mmol/L Final    Carbon Dioxide (CO2) 03/08/2024 26  22 - 29 mmol/L Final    Anion Gap 03/08/2024 8  7 - 15 mmol/L Final    Urea Nitrogen 03/08/2024 13.5  6.0 - 20.0 mg/dL Final    Creatinine 03/08/2024 0.64  0.51 - 0.95 mg/dL Final    GFR Estimate 03/08/2024 >90  >60 mL/min/1.73m2 Final    Calcium 03/08/2024 8.9  8.6 - 10.0 mg/dL Final    Chloride 03/08/2024 102  98 - 107 mmol/L Final    Glucose 03/08/2024 93  70 - 99 mg/dL Final    Alkaline Phosphatase 03/08/2024 54  40 - 150 U/L Final    AST 03/08/2024 15  0 - 45 U/L Final    ALT 03/08/2024 17  0 - 50 U/L Final    Protein Total 03/08/2024 7.7  6.4 - 8.3 g/dL Final    Albumin 03/08/2024 4.2  3.5 - 5.2 g/dL Final    Bilirubin Total 03/08/2024 0.5  <=1.2 mg/dL Final    TSH 03/08/2024 2.02  0.30 - 4.20 uIU/mL Final    Lipase 03/08/2024 62 (H)  13 - 60 U/L Final    Helicobacter pylori Antigen Stool 03/09/2024 Negative  Negative Final    WBC Count 03/08/2024 6.8  4.0 - 11.0 10e3/uL Final    RBC Count 03/08/2024 4.40  3.80 - 5.20 10e6/uL Final    Hemoglobin 03/08/2024 13.1  11.7 - 15.7 g/dL Final    Hematocrit 03/08/2024 38.8  35.0 - 47.0 % Final    MCV 03/08/2024 88  78 - 100 fL Final    MCH 03/08/2024 29.8  26.5 - 33.0 pg Final    MCHC 03/08/2024 33.8  31.5 - 36.5 g/dL Final    RDW 03/08/2024 12.5  10.0 - 15.0 % Final    Platelet Count 03/08/2024 217  150 - 450 10e3/uL Final    % Neutrophils 03/08/2024 65  % Final    % Lymphocytes 03/08/2024 23  % Final    % Monocytes  03/08/2024 10  % Final    % Eosinophils 03/08/2024 2  % Final    % Basophils 03/08/2024 1  % Final    % Immature Granulocytes 03/08/2024 0  % Final    Absolute Neutrophils 03/08/2024 4.4  1.6 - 8.3 10e3/uL Final    Absolute Lymphocytes 03/08/2024 1.5  0.8 - 5.3 10e3/uL Final    Absolute Monocytes 03/08/2024 0.7  0.0 - 1.3 10e3/uL Final    Absolute Eosinophils 03/08/2024 0.1  0.0 - 0.7 10e3/uL Final    Absolute Basophils 03/08/2024 0.1  0.0 - 0.2 10e3/uL Final    Absolute Immature Granulocytes 03/08/2024 0.0  <=0.4 10e3/uL Final    Bacteria Urine 03/08/2024 Few (A)  None Seen /HPF Final    RBC Urine 03/08/2024 None Seen  0-2 /HPF /HPF Final    WBC Urine 03/08/2024 0-5  0-5 /HPF /HPF Final    Squamous Epithelials Urine 03/08/2024 Moderate (A)  None Seen /LPF Final     Lab Results   Component Value Date    WBC 6.8 03/08/2024    HGB 13.1 03/08/2024     03/08/2024    ALT 17 03/08/2024    ALT 31 09/01/2023    AST 15 03/08/2024    AST 20 09/01/2023     03/08/2024    BUN 13.5 03/08/2024    CO2 26 03/08/2024    TSH 2.02 03/08/2024      Liver Function Studies -   Recent Labs   Lab Test 03/08/24  1516   PROTTOTAL 7.7   ALBUMIN 4.2   BILITOTAL 0.5   ALKPHOS 54   AST 15   ALT 17      PREVIOUS ENDOSCOPY  No results found for this or any previous visit.    ASSESSMENT/PLAN:  Sandi High is a 33 year old year old female with no significant PMHx  presenting to establish care with the [facility] GI group for epigastric abd pain. Her sx improved significantly with 14d of pepcid. she was taking NSAIDs mult times a week but has since stopped in early March. Weight and appetite are stable. Never smoker.     3/2024 -   H.pylori stool antigen NEG  TSH, CMP, CBC WNL  Lipase 62 (NL <60)  TTG Ab NEG     #epigastric abd pain  #heartburn   Sx suggestive of GERD and gastritis (NSAID induced in particular). DDX includes PUD, hepatobiliary disease, pancreatic disease, functional disease vs other     -trend lipase and check amylase. Recent CMP  OK  -order RUQ US   -start omeprazole 40 mg daily x 3 mo   -I asked she send mychart to me in 3-4 weeks on sx, sooner if needed. If sx still significant, will consider EGD  -advised cont to avoid NSAIDs   -anti-reflux recs per AVS   -ER precautions reviewed for s/sx of pancreatitis and or worsening sx which we discussed today   -she is agreeable w/ plan as documented      Future consideration  1.EGD as per above     Orders Placed This Encounter   Procedures    US Abdomen Limited    IgA    Lipase    Amylase     Colorectal cancer screening: aged 45, unless otherwise clinically indicated     RTC - 3 mo or PRN     Thank you for this consultation.  It was a pleasure to participate in the care of this patient; please contact me with any further questions.     Code per complexity         Again, thank you for allowing me to participate in the care of your patient.      Sincerely,    Carola Irving PA-C

## 2024-04-02 ENCOUNTER — TELEPHONE (OUTPATIENT)
Dept: GASTROENTEROLOGY | Facility: CLINIC | Age: 34
End: 2024-04-02
Payer: COMMERCIAL

## 2024-04-02 NOTE — TELEPHONE ENCOUNTER
Left Voicemail (1st Attempt) and Sent Mychart (1st Attempt) for the patient to call back and schedule the following:    Appointment type: Return GI  Provider: Carola Irving PA-C  Return date: 06/28/24  Specialty phone number: [phone]  Additional appointment(s) needed: N/A  Additonal Notes: N/A

## 2024-04-08 ENCOUNTER — LAB (OUTPATIENT)
Dept: LAB | Facility: CLINIC | Age: 34
End: 2024-04-08
Payer: COMMERCIAL

## 2024-04-08 ENCOUNTER — ANCILLARY PROCEDURE (OUTPATIENT)
Dept: ULTRASOUND IMAGING | Facility: CLINIC | Age: 34
End: 2024-04-08
Attending: PHYSICIAN ASSISTANT
Payer: COMMERCIAL

## 2024-04-08 DIAGNOSIS — R10.13 EPIGASTRIC PAIN: ICD-10-CM

## 2024-04-08 LAB
AMYLASE SERPL-CCNC: 68 U/L (ref 28–100)
IGA SERPL-MCNC: 294 MG/DL (ref 84–499)
LIPASE SERPL-CCNC: 52 U/L (ref 13–60)

## 2024-04-08 PROCEDURE — 83690 ASSAY OF LIPASE: CPT | Performed by: PATHOLOGY

## 2024-04-08 PROCEDURE — 99000 SPECIMEN HANDLING OFFICE-LAB: CPT | Performed by: PATHOLOGY

## 2024-04-08 PROCEDURE — 76705 ECHO EXAM OF ABDOMEN: CPT | Performed by: RADIOLOGY

## 2024-04-08 PROCEDURE — 82784 ASSAY IGA/IGD/IGG/IGM EACH: CPT | Performed by: PHYSICIAN ASSISTANT

## 2024-04-08 PROCEDURE — 82150 ASSAY OF AMYLASE: CPT | Performed by: PATHOLOGY

## 2024-04-08 PROCEDURE — 36415 COLL VENOUS BLD VENIPUNCTURE: CPT | Performed by: PATHOLOGY

## 2024-04-10 DIAGNOSIS — R93.2 ABNORMAL LIVER ULTRASOUND: Primary | ICD-10-CM

## 2024-04-22 ENCOUNTER — ANCILLARY PROCEDURE (OUTPATIENT)
Dept: CT IMAGING | Facility: CLINIC | Age: 34
End: 2024-04-22
Attending: PHYSICIAN ASSISTANT
Payer: COMMERCIAL

## 2024-04-22 DIAGNOSIS — R93.2 ABNORMAL LIVER ULTRASOUND: ICD-10-CM

## 2024-04-22 PROCEDURE — 74178 CT ABD&PLV WO CNTR FLWD CNTR: CPT | Mod: GC | Performed by: RADIOLOGY

## 2024-04-22 RX ORDER — IOPAMIDOL 755 MG/ML
64 INJECTION, SOLUTION INTRAVASCULAR ONCE
Status: COMPLETED | OUTPATIENT
Start: 2024-04-22 | End: 2024-04-22

## 2024-04-22 RX ADMIN — IOPAMIDOL 64 ML: 755 INJECTION, SOLUTION INTRAVASCULAR at 16:38

## 2024-04-22 NOTE — DISCHARGE INSTRUCTIONS

## 2024-05-26 ENCOUNTER — ANCILLARY PROCEDURE (OUTPATIENT)
Dept: MRI IMAGING | Facility: CLINIC | Age: 34
End: 2024-05-26
Attending: PHYSICIAN ASSISTANT
Payer: COMMERCIAL

## 2024-05-26 DIAGNOSIS — K76.9 LIVER LESION: ICD-10-CM

## 2024-05-26 PROCEDURE — 74183 MRI ABD W/O CNTR FLWD CNTR: CPT | Performed by: STUDENT IN AN ORGANIZED HEALTH CARE EDUCATION/TRAINING PROGRAM

## 2024-05-26 PROCEDURE — A9581 GADOXETATE DISODIUM INJ: HCPCS | Performed by: STUDENT IN AN ORGANIZED HEALTH CARE EDUCATION/TRAINING PROGRAM

## 2024-05-26 NOTE — DISCHARGE INSTRUCTIONS
MRI Contrast Discharge Instructions    The IV contrast you received today will pass out of your body in your  urine. This will happen in the next 24 hours. You will not feel this process.  Your urine will not change color.    Drink at least 4 extra glasses of water or juice today (unless your doctor  has restricted your fluids). This reduces the stress on your kidneys.  You may take your regular medicines.    If you are on dialysis: It is best to have dialysis today.    If you have a reaction: Most reactions happen right away. If you have  any new symptoms after leaving the hospital (such as hives or swelling),  call your hospital at the correct number below. Or call your family doctor.  If you have breathing distress or wheezing, call 911.    Special instructions: ***    I have read and understand the above information.    Signature:______________________________________ Date:___________    Staff:__________________________________________ Date:___________     Time:__________    [facility] Departments:    ___[place]: [phone]  ___[facility]: [phone]  ___[place]: [phone] ___[facility]: [phone]  ___[facility]: [phone]  ___[facility]: [phone]  ___[phone]  ___[facility]: [phone]  ___[phone]

## 2024-06-07 SDOH — HEALTH STABILITY: PHYSICAL HEALTH: ON AVERAGE, HOW MANY MINUTES DO YOU ENGAGE IN EXERCISE AT THIS LEVEL?: 20 MIN

## 2024-06-07 SDOH — HEALTH STABILITY: PHYSICAL HEALTH: ON AVERAGE, HOW MANY DAYS PER WEEK DO YOU ENGAGE IN MODERATE TO STRENUOUS EXERCISE (LIKE A BRISK WALK)?: 1 DAY

## 2024-06-07 ASSESSMENT — SOCIAL DETERMINANTS OF HEALTH (SDOH): HOW OFTEN DO YOU GET TOGETHER WITH FRIENDS OR RELATIVES?: ONCE A WEEK

## 2024-06-07 NOTE — COMMUNITY RESOURCES LIST (ENGLISH)
June 7, 2024           YOUR PERSONALIZED LIST OF SERVICES & PROGRAMS           & SHELTER    Housing      [facility] Homeless Resources and Housing Information - Emergency housing  [address] [place] [address] (Distance: 7.9 miles)  Language: English  Fee: Free  Accessibility: Translation services      [facility] [place] - Emergency Family Shelter  [address] [place] [address] (Distance: 7.6 miles)  Phone: [phone]  Website: https://www.saintandrewsLogicLibraryWell[facility]/  Language: English  Fee: Free      [facility] Health Care [facility] - [facility]  Phone: [phone]  Website: https://www.gis.to/  Language: English, Hmong, Oromo, Bangladeshi, Bulgarian  Hours: Mon 9:00 AM - 5:00 PM Tue 9:00 AM - 5:00 PM Wed 9:00 AM - 5:00 PM Thu 9:00 AM - 5:00 PM Fri 9:00 AM - 5:00 PM  Fee: Insurance  Accessibility: Blind accommodation, Deaf or hard of hearing, Translation services  Transportation Options: Free transportation    Case Management      [facility] Homeless Resources and Housing Information - Housing search assistance  [address] [place] [address] (Distance: 7.9 miles)  Language: English  Fee: Free  Accessibility: Translation services      [facility] Resources  [address] [place] [address] (Distance: 2.9 miles)  Phone: [phone]  Language: English  Fee: Free  Accessibility: Translation services      [employer] - Housing Stabilization Services  Phone: (879) [phone]  Website: https://homebasemn.com/  Language: English  Hours: Mon 8:00 AM - 4:00 PM Tue 8:00 AM - 4:00 PM Wed 8:00 AM - 4:00 PM Thu 8:00 AM - 4:00 PM Fri 8:00 AM - 4:00 PM  Fee: Free  Accessibility: Blind accommodation, Deaf or hard of hearing  Transportation Options: Free transportation    Drop-In Services      [facility] Warming or cooling [place]  [address] [place] [address] (Distance: 8.0 miles)  Language:  Greenlandic, English, Romina, Hmong, Faroese, Turks and Caicos Islander, Tamil  Fee: Free      [facility] - Warming or cooling center  [address] [place] [address] (Distance: 5.0 miles)  Phone: [phone]) [phone]  Language: English, Turks and Caicos Islander, German, Hmong  Fee: Free  Accessibility: Translation services      [facility] POSTAL SERVICE - MAIL SERVICE FOR THE HOMELESS  Phone: [phone]  Website: https://www.L[employer]is               IMPORTANT NUMBERS & WEBSITES        Emergency Services  911  .   United Way  211 http://211unitedway.org  .   Poison Control  (404) 623-5312 http://[place]poison.org http://wisconsinpoison.org  .     Suicide and Crisis Lifeline  988 http://988Nightingaleline.org  .   Childhelp [place] Child Abuse Hotline  935.410.9187 http://Childhelphotline.org   .   [place] Sexual Assault Hotline  (465) 229-8292 (HOPE) http://Helpful Alliancen.org   .     [place] Runaway Safeline  (959) 968-2271 (RUNAWAY) http://Fit FugitivesruLimin Chemical.Aprimo  .   Pregnancy & Postpartum Support  Call/text 274-085-9471  [place]: http://ppsupport[place].org  [place]: http://TruantToday.com/wi  .   Substance Abuse National Helpline ([facility])  736-843-HELP (0239) http://Findtreatment.gov   .                DISCLAIMER: These resources have been generated via the [employer] Platform. [employer] does not endorse any service providers mentioned in this resource list. [employer] does not guarantee that the services mentioned in this resource list will be available to you or will improve your health or wellness.    [facility]

## 2024-06-10 ENCOUNTER — OFFICE VISIT (OUTPATIENT)
Dept: FAMILY MEDICINE | Facility: CLINIC | Age: 34
End: 2024-06-10
Payer: COMMERCIAL

## 2024-06-10 VITALS
RESPIRATION RATE: 20 BRPM | WEIGHT: 103.6 LBS | HEART RATE: 66 BPM | DIASTOLIC BLOOD PRESSURE: 70 MMHG | BODY MASS INDEX: 19.56 KG/M2 | OXYGEN SATURATION: 98 % | SYSTOLIC BLOOD PRESSURE: 100 MMHG | TEMPERATURE: 99.8 F | HEIGHT: 61 IN

## 2024-06-10 DIAGNOSIS — Z23 NEED FOR HEPATITIS B VACCINATION: ICD-10-CM

## 2024-06-10 DIAGNOSIS — Z12.4 CERVICAL CANCER SCREENING: ICD-10-CM

## 2024-06-10 DIAGNOSIS — Z23 NEED FOR DIPHTHERIA-TETANUS-PERTUSSIS (TDAP) VACCINE: ICD-10-CM

## 2024-06-10 DIAGNOSIS — Z13.1 SCREENING FOR DIABETES MELLITUS: ICD-10-CM

## 2024-06-10 DIAGNOSIS — Z13.220 SCREENING FOR LIPID DISORDERS: ICD-10-CM

## 2024-06-10 DIAGNOSIS — Z71.89 ADVANCED DIRECTIVES, COUNSELING/DISCUSSION: ICD-10-CM

## 2024-06-10 DIAGNOSIS — Z11.3 SCREEN FOR STD (SEXUALLY TRANSMITTED DISEASE): ICD-10-CM

## 2024-06-10 DIAGNOSIS — R10.2 SUPRAPUBIC PAIN: ICD-10-CM

## 2024-06-10 DIAGNOSIS — Z13.21 ENCOUNTER FOR VITAMIN DEFICIENCY SCREENING: ICD-10-CM

## 2024-06-10 DIAGNOSIS — Z00.00 HEALTH CARE MAINTENANCE: ICD-10-CM

## 2024-06-10 DIAGNOSIS — R16.0 LIVER MASS, LEFT LOBE: ICD-10-CM

## 2024-06-10 DIAGNOSIS — R12 HEART BURN: ICD-10-CM

## 2024-06-10 DIAGNOSIS — Z23 NEED FOR HEPATITIS A VACCINATION: ICD-10-CM

## 2024-06-10 DIAGNOSIS — R10.13 EPIGASTRIC PAIN: ICD-10-CM

## 2024-06-10 DIAGNOSIS — R20.2 PARESTHESIAS: ICD-10-CM

## 2024-06-10 DIAGNOSIS — Z00.00 ROUTINE HISTORY AND PHYSICAL EXAMINATION OF ADULT: Primary | ICD-10-CM

## 2024-06-10 DIAGNOSIS — R43.2 DYSGEUSIA: ICD-10-CM

## 2024-06-10 LAB
CHOLEST SERPL-MCNC: 165 MG/DL
CLUE CELLS: NORMAL
FASTING STATUS PATIENT QL REPORTED: YES
FERRITIN SERPL-MCNC: 125 NG/ML (ref 6–175)
HBA1C MFR BLD: 5.4 % (ref 0–5.6)
HDLC SERPL-MCNC: 49 MG/DL
LDLC SERPL CALC-MCNC: 92 MG/DL
NONHDLC SERPL-MCNC: 116 MG/DL
TRICHOMONAS, WET PREP: NORMAL
TRIGL SERPL-MCNC: 121 MG/DL
VIT B12 SERPL-MCNC: 542 PG/ML (ref 232–1245)
VIT D+METAB SERPL-MCNC: 29 NG/ML (ref 20–50)
WBC'S/HIGH POWER FIELD, WET PREP: NORMAL
YEAST, WET PREP: NORMAL

## 2024-06-10 PROCEDURE — G0145 SCR C/V CYTO,THINLAYER,RESCR: HCPCS | Performed by: FAMILY MEDICINE

## 2024-06-10 PROCEDURE — 82728 ASSAY OF FERRITIN: CPT | Performed by: FAMILY MEDICINE

## 2024-06-10 PROCEDURE — 86780 TREPONEMA PALLIDUM: CPT | Performed by: FAMILY MEDICINE

## 2024-06-10 PROCEDURE — 82306 VITAMIN D 25 HYDROXY: CPT | Performed by: FAMILY MEDICINE

## 2024-06-10 PROCEDURE — 99213 OFFICE O/P EST LOW 20 MIN: CPT | Mod: 25 | Performed by: FAMILY MEDICINE

## 2024-06-10 PROCEDURE — 82607 VITAMIN B-12: CPT | Performed by: FAMILY MEDICINE

## 2024-06-10 PROCEDURE — 83036 HEMOGLOBIN GLYCOSYLATED A1C: CPT | Performed by: FAMILY MEDICINE

## 2024-06-10 PROCEDURE — 87210 SMEAR WET MOUNT SALINE/INK: CPT | Performed by: FAMILY MEDICINE

## 2024-06-10 PROCEDURE — 80061 LIPID PANEL: CPT | Performed by: FAMILY MEDICINE

## 2024-06-10 PROCEDURE — 86706 HEP B SURFACE ANTIBODY: CPT | Performed by: FAMILY MEDICINE

## 2024-06-10 PROCEDURE — 36415 COLL VENOUS BLD VENIPUNCTURE: CPT | Performed by: FAMILY MEDICINE

## 2024-06-10 PROCEDURE — 87340 HEPATITIS B SURFACE AG IA: CPT | Performed by: FAMILY MEDICINE

## 2024-06-10 PROCEDURE — 87624 HPV HI-RISK TYP POOLED RSLT: CPT | Performed by: FAMILY MEDICINE

## 2024-06-10 PROCEDURE — 87491 CHLMYD TRACH DNA AMP PROBE: CPT | Performed by: FAMILY MEDICINE

## 2024-06-10 PROCEDURE — 99395 PREV VISIT EST AGE 18-39: CPT | Mod: 25 | Performed by: FAMILY MEDICINE

## 2024-06-10 PROCEDURE — 87591 N.GONORRHOEAE DNA AMP PROB: CPT | Performed by: FAMILY MEDICINE

## 2024-06-10 ASSESSMENT — PAIN SCALES - GENERAL: PAINLEVEL: NO PAIN (0)

## 2024-06-10 NOTE — PATIENT INSTRUCTIONS
Seen today for preventive physical, first Pap and additional concerns and follow-up.  Healthcare maintenance reviewed.  Consider working on healthcare directives.  First day of last menstrual period was 5/28/2024.  Cycles are regular.  Had a IUD in place since December 2023 removed and of April due to discomfort which resolved and no longer with cramping.  Using condoms for contraception understands it can fail and was given emergency contraception to use in case that would happen.  Counseled that if using that frequently then may benefit from a daily preventative contraceptive medication but has not to take any hormones daily for now.  Been in a monogamous relationship but recommend completing STD workup with GC wet prep along with Pap today and doing syphilis and hep B titers as well today with cholesterol and hemoglobin A1c.  Previously CBC CMP TSH HIV hep C were negative in March and not repeated today.  Breast exam normal recommend self check regularly.  No family history of breast ovarian or colon cancer.  Mammograms starting age 40.  If family history should change then we can consider genetics referral in the future.  Pap HPV obtained with some difficulty today along with wet prep and GC if things are normal recheck in 5 years.  Vaccines reviewed.  Records from [place] not available will sign a release of information to get those immunizations.  If Tdap not up-to-date recommend getting that is due every 10 years.  Hepatitis B vaccination series of 3 shots recommended if not had that before we will also see what titers show to see if immune or not.  Has only had 1 shot of hepatitis A in the past recalls having had itchiness that lasted a week/hives that started several hours after shot was given not sure if related to shot or having menstrual cycle at the time that may have lowered immunity it is difficult to say.  Is hesitant to complete the shot given this past history.  Could consider in the future as not  clear if it was hepatitis A vaccine in and of itself that contributed to symptoms.  History of epigastric pain and heartburn.  Intermittent off-and-on many years but persistent when seen in March at that time celiac H. pylori CBC UA CMP TSH hep C HIV negative was given Pepcid and dietary and lifestyle recommendations to avoid NSAIDs and while it improved symptoms it did not relieve it completely when seen by GI end of March virtually was advised to do right upper quadrant ultrasound or repeat amylase lipase celiac testing was done which was normal the lipase which had been 2 points above upper limit of normal came back improved at normal.  Was advised to take omeprazole 40 mg daily for 3 months avoid NSAIDs and continue with antireflux measures.  If symptoms did not improve to consider endoscopy.  Follow-up with GI as planned on the 28th.  Chronic use of proton pump inhibitors like omeprazole while very effective for reflux may increase risk of atypical pneumonia, fractures C. difficile colitis, magnesium and B12 deficiency.  As things improve may be you can wean off it to the lowest effective dosing and transition back to Pepcid for long-term.  If requiring it long-term may benefit from an endoscopy.  During the workup for epigastric abdominal pain ultrasound done showed normal gallbladder but too small spots in the liver.  A CT abdomen done in April showed at that time IUD in place simple cyst left kidney and a 1.6 cm liver lesion in segment 7 of the liver and advised an MRI.  MRI done 5/26 showed the liver lesion about 1.6 cm that was suggestive of either biliary hamartoma versus atypical hemangioma or adenoma.  Also has tiny hepatic cysts that are not concerning.  Please discuss with GI when you see them on 28 June.  Symptoms of increased salty taste of tongue could be related to heartburn, could be related to medication.  Since on a chronic PPI recommend checking vitamin B and D for today and going from  "there.  Tingling numbness in extremities appear to be positional after sleeping on side and seem to have decreased in frequency with change in pillow.  Currently no sign of nerve impingement or scoliosis.  Will check vitamin B and iron and vitamin D today.  If symptoms do not improve to contact us for further evaluation.  Return in 1 year for preventive physical and sooner in an office visit for any new concerns    The above note was dictated using voice recognition. Although reviewed after completion, some word and grammatical error may remain .       Preventive Care Advice   This is general advice we often give to help people stay healthy. Your care team may have specific advice just for you. Please talk to your care team about your own preventive care needs.  Lifestyle  Exercise at least 150 minutes each week (30 minutes a day, 5 days a week).  Do muscle strengthening activities 2 days a week. These help control your weight and prevent disease.  No smoking.  Wear sunscreen to prevent skin cancer.  Have your home tested for radon every 2 to 5 years. Radon is a colorless, odorless gas that can harm your lungs. To learn more, go to www.health.[facility].[place]. and search for \"Radon in Homes.\"  Keep guns unloaded and locked up in a safe place like a safe or gun vault, or, use a gun lock and hide the keys. Always lock away bullets separately. To learn more, visit [employer].[place].gov and search for \"safe gun storage.\"  Nutrition  Eat 5 or more servings of fruits and vegetables each day.  Try wheat bread, brown rice and whole grain pasta (instead of white bread, rice, and pasta).  Get enough calcium and vitamin D. Check the label on foods and aim for 100% of the RDA (recommended daily allowance).  Regular exams  Have a dental exam and cleaning every 6 months.  See your health care team every year to talk about:  Any changes in your health.  Any medicines your care team has prescribed.  Preventive care, family planning, and ways to " prevent chronic diseases.  Shots (vaccines)   HPV shots (up to age 26), if you've never had them before.  Hepatitis B shots (up to age 59), if you've never had them before.  COVID-19 shot: Get this shot when it's due.  Flu shot: Get a flu shot every year.  Tetanus shot: Get a tetanus shot every 10 years.  Pneumococcal, hepatitis A, and RSV shots: Ask your care team if you need these based on your risk.  Shingles shot (for age 50 and up).  General health tests  Diabetes screening:  Starting at age 35, Get screened for diabetes at least every 3 years.  If you are younger than age 35, ask your care team if you should be screened for diabetes.  Cholesterol test: At age 39, start having a cholesterol test every 5 years, or more often if advised.  Bone density scan (DEXA): At age 50, ask your care team if you should have this scan for osteoporosis (brittle bones).  Hepatitis C: Get tested at least once in your life.  Abdominal aortic aneurysm screening: Talk to your doctor about having this screening if you:  Have ever smoked; and  Are biologically male; and  Are between the ages of 65 and 75.  STIs (sexually transmitted infections)  Before age 24: Ask your care team if you should be screened for STIs.  After age 24: Get screened for STIs if you're at risk. You are at risk for STIs (including HIV) if:  You are sexually active with more than one person.  You don't use condoms every time.  You or a partner was diagnosed with a sexually transmitted infection.  If you are at risk for HIV, ask about PrEP medicine to prevent HIV.  Get tested for HIV at least once in your life, whether you are at risk for HIV or not.  Cancer screening tests  Cervical cancer screening: If you have a cervix, begin getting regular cervical cancer screening tests at age 21. Most people who have regular screenings with normal results can stop after age 65. Talk about this with your provider.  Breast cancer scan (mammogram): If you've ever had  breasts, begin having regular mammograms starting at age 40. This is a scan to check for breast cancer.  Colon cancer screening: It is important to start screening for colon cancer at age 45.  Have a colonoscopy test every 10 years (or more often if you're at risk) Or, ask your provider about stool tests like a FIT test every year or Cologuard test every 3 years.  To learn more about your testing options, visit: [email or web address]/673659.pdf.  For help making a decision, visit: [email or web address].  Prostate cancer screening test: If you have a prostate and are age 55 to 69, ask your provider if you would benefit from a yearly prostate cancer screening test.  Lung cancer screening: If you are a current or former smoker age 50 to 80, ask your care team if ongoing lung cancer screenings are right for you.  For informational purposes only. Not to replace the advice of your health care provider. Copyright   2023 [facility]. All rights reserved. Clinically reviewed by the [facility]. 140 Proof [id number] - REV 04/24.

## 2024-06-10 NOTE — PROGRESS NOTES
The below note was dictated using voice recognition. Although reviewed after completion, some word and grammatical error may remain .     Preventive Care Visit  [facility]  Kae Peck MD, Family Medicine  Alan 10, 2024      Assessment & Plan     Routine history and physical examination of adult  Seen today for preventive physical, first Pap and additional concerns and follow-up.  Healthcare maintenance reviewed.  Consider working on healthcare directives.  First day of last menstrual period was 5/28/2024. Cycles are regular.  Had a IUD in place since December 2023 removed end of April due to discomfort which resolved and no longer with cramping.  Using condoms for contraception, understood it can fail and was given emergency contraception to use in case that would happen.  Counseled that if using that frequently then may benefit from a daily preventative contraceptive medication but desired to not to take any hormones daily for now.  Been in a monogamous relationship but recommend completing STD workup with GC wet prep along with Pap today and doing syphilis and hep B titers as well today with cholesterol and hemoglobin A1c.  Previously CBC CMP TSH HIV hep C were negative in March and not repeated today.  Breast exam normal recommend self check regularly.  No family history of breast ovarian or colon cancer.  Mammograms starting age 40.  If family history should change then we can consider genetics referral in the future.  Pap HPV obtained with some difficulty today along with wet prep and GC if things are normal recheck in 5 years.  Vaccines reviewed.  Records from [place] not available will sign a release of information to get those immunizations.  If Tdap not up-to-date recommend getting that, is due every 10 years.  Hepatitis B vaccination series of 3 shots recommended if not had that before we will also see what titers show to see if immune or not.  Has only had 1 shot of hepatitis  A in the past recalls having had itchiness that lasted a week/hives that started several hours after shot was given not sure if related to shot or having menstrual cycle at the time that may have lowered immunity it is difficult to say.  Allergist previously told her it was not an allergic reaction. But is hesitant to complete the shot given this past history.  Could consider in the future as not clear if it was hepatitis A vaccine in and of itself that contributed to symptoms.  Later labs showed normal HBA1C, lipids, wet prep, GC, syphilis, negative for Hep B infection but immune likely from prior vaccination, normal Vitamin B12, Vitamin D & ferritin    History of epigastric pain and heartburn.  Intermittent off-and-on many years but persistent when seen in March, at that time celiac, H. Pylori, CBC, UA, CMP ,TSH,  hep C&  HIV negative. Was given Pepcid and dietary and lifestyle recommendations to avoid NSAID's and while it improved symptoms it did not relieve it completely when seen by GI end of March virtually was advised to do right upper quadrant ultrasound,  repeat amylase lipase celiac testing which was done & normal. The lipase which had been 2 points above upper limit of normal came back improved at normal.  Was advised to take omeprazole 40 mg daily for 3 months avoid NSAID's and continue with antireflux measures.  If symptoms did not improve to consider endoscopy.  To follow-up with GI as planned on the 28th.  Discussed chronic use of proton pump inhibitors like omeprazole while very effective for reflux may increase risk of atypical pneumonia, fractures C. difficile colitis, magnesium and B12 deficiency.  As things improve may be she can wean off it or to the lowest effective dosing and transition back to Pepcid for long-term along with antireflux measures.  If requiring it long-term may benefit from an endoscopy. Feels in general heart burn symptoms are better.    During the workup for epigastric  abdominal pain ultrasound done showed normal gall bladder & 2 nodules right lobe liver ? Hemangiomas and advised ct /mri to further characterize. On 4/22 had ct abdomen showed an indeterminate 1.6 cm liver segment 7 lesion and advised a liver MRI. On 5/26 MRI liver showed hepatic segment 7 lobulated T2 hyperintense lesion corresponding to findings on recent ct & u/s and felt likely representing an indolent lesion like a biliary hamartoma and less likely an atypical hemangioma or adenoma. Scattered similar tiny hepatic cysts seen. To see GI in couple weeks.& discuss this  with GI.    Symptoms of increased salty taste of tongue could be related to heartburn, could be related to medication.  Since on a chronic PPI recommend checking vitamin B and D for today and going from there. Later B 12  & vit d came back normal     Tingling numbness in extremities appear to be positional after sleeping on side and seem to have decreased in frequency with change in pillow.  Currently no sign of nerve impingement or scoliosis.  Will check vitamin B and iron and vitamin D today.  If symptoms do not improve to contact us for further evaluation.    Return in 1 year for preventive physical and sooner in an office visit for any new concerns   - Lipid panel reflex to direct LDL Non-fasting; Future  - Hemoglobin A1c; Future  - Wet prep - lab collect; Future  - Hepatitis B Surface Antibody; Future  - Hepatitis B surface antigen; Future  - Treponema Abs w Reflex to RPR and Titer; Future  - Gynecologic Cytology (Pap) and HPV - Recommended Age 30-65 Years  - Vitamin D Deficiency; Future  - PRIMARY CARE FOLLOW-UP SCHEDULING; Future  - Wet prep - lab collect  - Lipid panel reflex to direct LDL Non-fasting  - Hemoglobin A1c  - Hepatitis B Surface Antibody  - Hepatitis B surface antigen  - Treponema Abs w Reflex to RPR and Titer  - Vitamin D Deficiency  - NEISSERIA GONORRHOEA PCR  - CHLAMYDIA TRACHOMATIS PCR  - Gynecologic Cytology  (PAP)    Cervical cancer screening  - Gynecologic Cytology (Pap) and HPV - Recommended Age 30-65 Years  - Gynecologic Cytology (PAP)    Suprapubic pain  Resolved once IUD placed dec 2023 removed in April 2024    Epigastric pain  Heart burn  History of epigastric pain and heartburn.  Intermittent off-and-on many years but persistent when seen in March, at that time celiac, H. Pylori, CBC, UA, CMP ,TSH,  hep C&  HIV negative. Was given Pepcid and dietary and lifestyle recommendations to avoid NSAID's and while it improved symptoms it did not relieve it completely when seen by GI end of March virtually was advised to do right upper quadrant ultrasound,  repeat amylase lipase celiac testing which was done & normal. The lipase which had been 2 points above upper limit of normal came back improved at normal.  Was advised to take omeprazole 40 mg daily for 3 months avoid NSAID's and continue with antireflux measures.  If symptoms did not improve to consider endoscopy.  To follow-up with GI as planned on the 28th.  Discussed chronic use of proton pump inhibitors like omeprazole while very effective for reflux may increase risk of atypical pneumonia, fractures C. difficile colitis, magnesium and B12 deficiency.  As things improve may be she can wean off it or to the lowest effective dosing and transition back to Pepcid for long-term along with antireflux measures.  If requiring it long-term may benefit from an endoscopy. Feels in general heart burn symptoms are better.    Liver mass, left lobe  During the workup for epigastric abdominal pain ultrasound done showed normal gall bladder & 2 nodules right lobe liver ? Hemangiomas and advised ct /mri to further characterize. On 4/22 had ct abdomen showed an indeterminate 1.6 cm liver segment 7 lesion and advised a liver MRI. On 5/26 MRI liver showed hepatic segment 7 lobulated T2 hyperintense lesion corresponding to findings on recent ct & u/s and felt likely representing an  indolent lesion like a biliary hamartoma and less likely an atypical hemangioma or adenoma. Scattered similar tiny hepatic cysts seen. To see GI in couple weeks.& discuss this  with GI  - Hepatitis B Surface Antibody; Future  - Hepatitis B surface antigen; Future  - Hepatitis B Surface Antibody  - Hepatitis B surface antigen    Dysgeusia  Symptoms of increased salty taste of tongue could be related to heartburn, could be related to medication.  Since on a chronic PPI recommend checking vitamin B and D for today and going from there. Later B 12  & vit d came back normal   - Vitamin B12; Future  - Ferritin; Future  - Vitamin D Deficiency; Future  - Vitamin B12  - Ferritin  - Vitamin D Deficiency    Paresthesias  Tingling numbness in extremities appear to be positional after sleeping on side and seem to have decreased in frequency with change in pillow.  Currently no sign of nerve impingement or scoliosis.  Will check vitamin B and iron and vitamin D today.  If symptoms do not improve to contact us for further evaluation.  - Vitamin B12; Future  - Ferritin; Future  - Vitamin D Deficiency; Future  - Vitamin B12  - Ferritin  - Vitamin D Deficiency    Health care maintenance  Healthcare maintenance reviewed.  Consider working on healthcare directives.  First day of last menstrual period was 5/28/2024. Cycles are regular.  Had a IUD in place since December 2023 removed end of April due to discomfort which resolved and no longer with cramping.  Using condoms for contraception, understood it can fail and was given emergency contraception to use in case that would happen.  Counseled that if using that frequently then may benefit from a daily preventative contraceptive medication but desired to not to take any hormones daily for now.  Been in a monogamous relationship but recommend completing STD workup with GC wet prep along with Pap today and doing syphilis and hep B titers as well today with cholesterol and hemoglobin A1c.   Previously CBC CMP TSH HIV hep C were negative in March and not repeated today.  Breast exam normal recommend self check regularly.  No family history of breast ovarian or colon cancer.  Mammograms starting age 40.  If family history should change then we can consider genetics referral in the future.  Pap HPV obtained with some difficulty today along with wet prep and GC if things are normal recheck in 5 years.  Vaccines reviewed.  Records from [place] not available will sign a release of information to get those immunizations.  If Tdap not up-to-date recommend getting that, is due every 10 years.  Hepatitis B vaccination series of 3 shots recommended if not had that before we will also see what titers show to see if immune or not.  Has only had 1 shot of hepatitis A in the past recalls having had itchiness that lasted a week/hives that started several hours after shot was given not sure if related to shot or having menstrual cycle at the time that may have lowered immunity it is difficult to say.  Allergist previously told her it was not an allergic reaction. But is hesitant to complete the shot given this past history.  Could consider in the future as not clear if it was hepatitis A vaccine in and of itself that contributed to symptoms.  Later labs showed normal HBA1C, lipids, wet prep, GC, syphilis, negative for Hep B infection but immune likely from prior vaccination, normal Vitamin B12, Vitamin D & ferritin  Return in 1 year for preventive physical and sooner in an office visit for any new concerns   - REVIEW OF HEALTH MAINTENANCE PROTOCOL ORDERS    Advanced directives, counseling/discussion    Need for diphtheria-tetanus-pertussis (Tdap) vaccine  Records from [place] not available will sign a release of information to get those immunizations.  If Tdap not up-to-date recommend getting that, is due every 10 years.    Need for hepatitis B vaccination  Hepatitis B vaccination series of 3 shots recommended if not  had that before we will also see what titers show to see if immune or not.  Titers later showed immunity & no hep B vaccine needed    Need for hepatitis A vaccination  Has only had 1 shot of hepatitis A in the past recalls having had itchiness that lasted a week/hives that started several hours after shot was given not sure if related to shot or having menstrual cycle at the time that may have lowered immunity it is difficult to say.  Allergist previously told her it was not an allergic reaction. But is hesitant to complete the shot given this past history.  Could consider in the future as not clear if it was hepatitis A vaccine in and of itself that contributed to symptoms.    Screening for lipid disorders  - Lipid panel reflex to direct LDL Non-fasting; Future  - Lipid panel reflex to direct LDL Non-fasting    Screening for diabetes mellitus  - HBA1C    Encounter for vitamin deficiency screening  - Vitamin D Deficiency; Future  - Vitamin D Deficiency    Screen for STD (sexually transmitted disease)  - Hepatitis B Surface Antibody; Future  - Hepatitis B surface antigen; Future  - Treponema Abs w Reflex to RPR and Titer; Future  - Hepatitis B Surface Antibody  - Hepatitis B surface antigen  - Treponema Abs w Reflex to RPR and Titer  - NEISSERIA GONORRHOEA PCR  - CHLAMYDIA TRACHOMATIS PCR    Patient has been advised of split billing requirements and indicates understanding: Yes    Counseling  Appropriate preventive services were discussed with this patient, including applicable screening as appropriate for fall prevention, nutrition, physical activity, Tobacco-use cessation, weight loss and cognition.  Checklist reviewing preventive services available has been given to the patient.  Regular exercise  See Patient Instructions        Jeff Junior is a 33 year old, presenting for the following:  Physical (With PAP/Discuss sensitivity with the tongue, everything taste salty, noticed 2-3 months ago.)        6/10/2024      3:04 PM   Additional Questions   Roomed by SALLY Mckee   Accompanied by Self        Health Care Directive  Patient does not have a Health Care Directive or Living Will: Discussed advance care planning with patient; information given to patient to review.    HPI        6/7/2024   General Health   How would you rate your overall physical health? (!) FAIR   Feel stress (tense, anxious, or unable to sleep) To some extent   (!) STRESS CONCERN      6/7/2024   Nutrition   Three or more servings of calcium each day? Yes   Diet: Regular (no restrictions)   How many servings of fruit and vegetables per day? (!) 2-3   How many sweetened beverages each day? 0-1         6/7/2024   Exercise   Days per week of moderate/strenuous exercise 1 day   Average minutes spent exercising at this level 20 min   (!) EXERCISE CONCERN      6/7/2024   Social Factors   Frequency of gathering with friends or relatives Once a week   Worry food won't last until get money to buy more No   Food not last or not have enough money for food? No   Do you have housing?  No   Are you worried about losing your housing? No   Lack of transportation? No   Unable to get utilities (heat,electricity)? No   Want help with housing or utility concern? No   (!) HOUSING CONCERN PRESENT      6/7/2024   Dental   Dentist two times every year? Yes         6/7/2024   TB Screening   Were you born outside of the [place]? Yes         Today's PHQ-2 Score:       3/29/2024     6:46 AM   PHQ-2 ( 1999 [employer])   Q1: Little interest or pleasure in doing things 0   Q2: Feeling down, depressed or hopeless 0   PHQ-2 Score 0         6/7/2024   Substance Use   Alcohol more than 3/day or more than 7/wk Not Applicable   Do you use any other substances recreationally? No     Social History     Tobacco Use    Smoking status: Never    Smokeless tobacco: Never   Vaping Use    Vaping status: Never Used   Substance Use Topics    Alcohol use: Never    Drug use: Never             6/7/2024   Breast Cancer  Screening   Family history of breast, colon, or ovarian cancer? No / Unknown      Mammogram Screening - Patient under 40 years of age: Routine Mammogram Screening not recommended.         6/7/2024   STI Screening   New sexual partner(s) since last STI/HIV test? 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  Removed in April 2024 with resolution of cramping. Hx of epigastric pain, heart burn, left lobe liver mass, dysgeusia under care of GI, hx of paresthesias, [place]  negative    Seen first time 3/8/24 for history of epigastric stomach pain and heartburn. Had had intermittent stomach discomfort on and off in the past but had been persistent for 1 week.  Did have an episode of heartburn that lasted the whole day 5 days prior that had improved with use of Tums. Had already been avoiding caffeine, carbonated drinks ,spicy foods, & had reported no NSAID's recently ( none in the week prior at least) and reported eating well before went to bed and had had no increased stress or anxiety in life. Symptoms suggestive of gastroesophageal reflux disease/heartburn.  Recommended  frequent small meals, avoid eating 3 hours before bedtime, continue to avoid greasy overly fried foods, avoid spicy foods, caffeine & carbonated drinks, alcohol etc. Try to eat a bland diet for few weeks. Given Pepcid 20 mg twice a day for 2 weeks. Labs done BC CMP lipase H. pylori stool test ordered. Martha decision made to hold off on an ultrasound for gallstones. But if symptoms did not improve with medication and dietary and lifestyle changes and lab work did not explain symptoms then recommended to see GI.  Referral in place. Possibly getting an endoscopy. If should have worsening pain to the point of vomiting or inability to tolerate anything by mouth, blood in the stools or black, then to contact us right away and go to the ER.  Prelim labs showed normal CBC. Noted had mild suprapubic pelvic cramping since had Mirena IUD placed in December 2024. Pelvic ultrasound in January 2024 had noted IUD in good position and had a normal benign looking left ovarian cyst. Could take Tylenol for pain and heating pad to area but avoid anti-inflammatories given the stomach symptoms. Urine was normal. No blood seen on microscopy and also no infection noted. Labs came back with normal cbc, CMP, TSH, HIV, hep C, celiac & later H Pylori noted negative. Lipase just 2 points above upper limit of normal.   Encouraged to return for preventive physical and first time Pap smear to provider of choice at her convenience in the summer. & bring in he immunization records to update in epic. It appeared we may not have her Tdap or hepatitis B records & might need vaccination if never had these We could also check titers in the future to assess immunity. She is advised to contact us sooner for any concerns    Seen by GI 3/21/24 virtually noted symptoms improved on Pepcid. Advised to recheck lipase and amylase. RUQ u/s ordered and given omeprazole 40 mg to take for 3 months , to avoid NSAID, continue with antireflux measures and possible EGD.  O 4/8/celiac test lipase and amylase was normal. On 4/8 u/s showed normal gall bladder &2 nodules right lobe liver ? Hemangiomas and advised ct /mri to further characterize. On 4/22 had ct abdomen showed an indeterminate 1.6 cm liver segment 7 lesion and advised a liver MRI. On 5/26 MRI liver showed hepatic segment 7 lobulated T2 hyperintense lesion corresponding to findings on recent ct & u/s and felt likely representing an indolent lesion like a biliary hamartoma and less likely an atypical hemangioma or adenoma. Scattered similar tiny hepatic cysts seen.     CURRENTLY  Here for a physical  Since seen moved to [place] with boyfriend   reviewed  No ACP on file  Mirena IUD placed dec 2023, was removed end of April due to suprapubic cramping that resolved once it was out, now only using condoms. Is in a monogamous relationship. Reports when IUD was removed was given an emergency to pill to use in case condom breaks. Declines taking a daily hormonal med.  LMP 5/28/24, Regular cycles. U/s in jan showed a physiologic LFT ovarian cyst at the time  Reports never had pap & will get first time PAP with HPV today  It appears we may not have her Tdap or hepatitis B records or might need vaccination if never had these. Will sign JOSETTE to get immunization records from [place]  Will check titers to assess immunity.  Only had one hep a shot. Recalls 13 hrs after getting shot had itching that lasted a week and had to go to er where given a sterid seen by allergist who did not feel this was an allergy to hep a but declines to get # 2 as scared to get it    History of epigastric stomach pain and heartburn intermittently many year but worse in march when seen & started on Pepcid & advised avoiding caffeine, carbonated drink, spicy foods, NSAID's & recommended frequent small meals, avoid eating 3 hours before bedtime, continue to avoid trigger foods.  Urine was normal. No blood seen on microscopy and also no infection noted. Labs  came back with normal cbc, CMP, TSH, HIV, hep C, celiac & later H Pylori noted negative. Lipase just 2 points above upper limit of normal. Seen by GI 3/21/24 virtually noted symptoms improved on Pepcid. Advised to recheck lipase and amylase. RUQ u/s ordered and given omeprazole 40 mg to take for 3 months , to avoid NSAID, continue with antireflux measures and possible EGD. O /celiac test lipase and amylase was normal. On  u/s showed normal gall bladder &2 nodules right lobe liver ? Hemangiomas and advised ct /mri to further characterize. On  had ct abdomen showed an indeterminate 1.6 cm liver segment 7 lesion and advised a liver MRI. On  MRI liver showed hepatic segment 7 lobulated T2 hyperintense lesion corresponding to findings on recent ct & u/s and felt likely representing an indolent lesion like a biliary hamartoma and less likely an atypical hemangioma or adenoma. Scattered similar tiny hepatic cysts seen. To see GI in couple weeks. & feels in general pain & heartburn better    Liver nodule to discuss recent scans with GI at upcoming apt.    New symptom has noted tongue feels food saltier & burning in mouth past 3 months. No weight loss     Also notes intermittent tingling numbness in arms and legs, lasts 1 to 2 hrs the goes away, may be positional. Last occurred 3 days ago Right leg and arm. Is testing a change in pillow & changing position trying not to sit too long & noted freq decreasing. No tremors. No falls. Declines intervention at this time. OK to B 12 lab. Will monitor for now.     Past Medical History:   Diagnosis Date    Suprapubic pain 06/10/2024    resolved once iud removed 2024       Past Surgical History:   Procedure Laterality Date    WISDOM TOOTH EXTRACTION      2 removed age 19, then 3rd age 22, 4th removed      OB History    Para Term  AB Living   0 0 0 0 0 0   SAB IAB Ectopic Multiple Live Births   0 0 0 0 0     Lab work is in process  Labs reviewed in  "EPIC  BP Readings from Last 3 Encounters:   06/10/24 100/70   03/08/24 98/68   09/02/23 104/59    Wt Readings from Last 3 Encounters:   06/10/24 47 kg (103 lb 9.6 oz)   03/08/24 47.3 kg (104 lb 3.2 oz)   09/01/23 46.3 kg (102 lb)         Patient Active Problem List   Diagnosis    Epigastric pain    Heart burn    Liver mass, left lobe    Dysgeusia    Paresthesias     Past Surgical History:   Procedure Laterality Date    WISDOM TOOTH EXTRACTION      2 removed age 19, then 3rd age 22, 4th removed 2023       Social History     Tobacco Use    Smoking status: Never    Smokeless tobacco: Never   Substance Use Topics    Alcohol use: Never     Family History   Problem Relation Age of Onset    Gastrointestinal Disease Mother         sensitive stomach    Hypertension Mother     Glaucoma Mother     Hypertension Father     Hyperlipidemia Father          Current Outpatient Medications   Medication Sig Dispense Refill    mvw complete formulation (CHEWABLES) tablet Take 1 tablet by mouth daily      omeprazole (PRILOSEC) 40 MG DR capsule Take 1 capsule (40 mg) by mouth daily 30-60 min before breakfast 90 capsule 0     Allergies   Allergen Reactions    Dust Mites Itching    Pollen Extract      Recent Labs   Lab Test 06/10/24  1638 03/08/24  1516 09/01/23  2332   A1C 5.4  --   --    LDL 92  --   --    HDL 49*  --   --    TRIG 121  --   --    ALT  --  17 31   CR  --  0.64  --    GFRESTIMATED  --  >90  --    POTASSIUM  --  4.1  --    TSH  --  2.02  --       Review of Systems  Constitutional, HEENT, cardiovascular, pulmonary, GI, , musculoskeletal, neuro, skin, endocrine and psych systems are negative, except as otherwise noted.     Objective    Exam  /70 (BP Location: Right arm, Patient Position: Sitting, Cuff Size: Adult Small)   Pulse 66   Temp 99.8  F (37.7  C) (Temporal)   Resp 20   Ht 1.549 m (5' 1\")   Wt 47 kg (103 lb 9.6 oz)   LMP 05/28/2024 (Exact Date)   SpO2 98%   BMI 19.58 kg/m     Estimated body mass index is " "19.58 kg/m  as calculated from the following:    Height as of this encounter: 1.549 m (5' 1\").    Weight as of this encounter: 47 kg (103 lb 9.6 oz).    Physical Exam  GENERAL: alert and no distress  EYES: Eyes grossly normal to inspection, PERRL and conjunctivae and sclerae normal  HENT: ear canals and TM's normal, nose and mouth without ulcers or lesions  NECK: no adenopathy, no asymmetry, masses, or scars  RESP: lungs clear to auscultation - no rales, rhonchi or wheezes  BREAST: normal without masses, tenderness or nipple discharge and no palpable axillary masses or adenopathy  CV: regular rate and rhythm, normal S1 S2, no S3 or S4, no murmur, click or rub, no peripheral edema  ABDOMEN: soft, non tender, no hepatosplenomegaly, no masses and bowel sounds normal   (female): normal female external genitalia, normal urethral meatus, normal vaginal mucosa. PAP / HPV obtained, sample bottle labelled with signed patient sticker with correct date of birth and name confirmed. Obtained GC &  wet prep  MS: no gross musculoskeletal defects noted, no edema  SKIN: no suspicious lesions or rashes  NEURO: Normal strength and tone, mentation intact and speech normal  PSYCH: mentation appears normal, affect normal/bright  LYMPH: no cervical, supraclavicular, axillary, or inguinal adenopathy    Signed Electronically by: Kae Peck MD  "

## 2024-06-10 NOTE — RESULT ENCOUNTER NOTE
Hello -    Here are my comments about your recent results:  -A1C (diabetic test) is normal and indicates that your blood sugar has been in a normal range the last 3 months.  -No signs of bacteria or yeast vaginal infections.  For additional lab test information, [email or web address] is an excellent reference..    Please let us know if you have any questions or concerns.     Regards,  Kae Peck MD

## 2024-06-11 LAB
HBV SURFACE AB SERPL IA-ACNC: >1000 M[IU]/ML
HBV SURFACE AB SERPL IA-ACNC: REACTIVE M[IU]/ML
HBV SURFACE AG SERPL QL IA: NONREACTIVE
HPV HR 12 DNA CVX QL NAA+PROBE: NEGATIVE
HPV16 DNA CVX QL NAA+PROBE: NEGATIVE
HPV18 DNA CVX QL NAA+PROBE: NEGATIVE
HUMAN PAPILLOMA VIRUS FINAL DIAGNOSIS: NORMAL
T PALLIDUM AB SER QL: NONREACTIVE

## 2024-06-11 NOTE — RESULT ENCOUNTER NOTE
Hello -    Here are my comments about your recent results:  -Cholesterol levels (LDL,HDL, Triglycerides) are normal.  ADVISE: rechecking in 1 year.   -Vitamin B12 result is normal  -Vitamin D level is normal and getting 1000 IU daily in your diet or supplements is recommended.   -Ferritin (iron) level is normal.  For additional lab test information, [email or web address] is an excellent reference..    Please let us know if you have any questions or concerns.     Regards,  Kae Peck MD

## 2024-06-11 NOTE — RESULT ENCOUNTER NOTE
Hello -    Here are my comments about your recent results:  Negative for syphilis    Please let us know if you have any questions or concerns.     Regards,  Kae Peck MD

## 2024-06-12 LAB
C TRACH DNA SPEC QL NAA+PROBE: NEGATIVE
N GONORRHOEA DNA SPEC QL NAA+PROBE: NEGATIVE

## 2024-06-12 NOTE — RESULT ENCOUNTER NOTE
Hello -    Here are my comments about your recent results:  -Chlamydia and gonnohrea tests are normal.  For additional lab test information, [email or web address] is an excellent reference..    Please let us know if you have any questions or concerns.     Regards,  Kae Peck MD

## 2024-06-12 NOTE — RESULT ENCOUNTER NOTE
Hello -    Here are my comments about your recent results:  Testing indicates no hepatitis b infection and immune likely from prior vaccination, currently no additional hep B vaccine needed    Please let us know if you have any questions or concerns.     Regards,  Kae Peck MD

## 2024-06-14 LAB
BKR LAB AP GYN ADEQUACY: NORMAL
BKR LAB AP GYN INTERPRETATION: NORMAL
BKR LAB AP LMP: NORMAL
BKR LAB AP PREVIOUS ABNORMAL: NORMAL
PATH REPORT.COMMENTS IMP SPEC: NORMAL
PATH REPORT.COMMENTS IMP SPEC: NORMAL
PATH REPORT.RELEVANT HX SPEC: NORMAL

## 2024-06-24 DIAGNOSIS — R12 HEART BURN: ICD-10-CM

## 2024-06-24 DIAGNOSIS — R10.13 EPIGASTRIC PAIN: ICD-10-CM

## 2024-06-28 ENCOUNTER — OFFICE VISIT (OUTPATIENT)
Dept: GASTROENTEROLOGY | Facility: CLINIC | Age: 34
End: 2024-06-28
Payer: COMMERCIAL

## 2024-06-28 VITALS
HEART RATE: 75 BPM | WEIGHT: 104 LBS | DIASTOLIC BLOOD PRESSURE: 76 MMHG | SYSTOLIC BLOOD PRESSURE: 109 MMHG | OXYGEN SATURATION: 97 % | BODY MASS INDEX: 19.63 KG/M2 | HEIGHT: 61 IN

## 2024-06-28 DIAGNOSIS — K76.9 LIVER LESION: Primary | ICD-10-CM

## 2024-06-28 DIAGNOSIS — R12 HEART BURN: ICD-10-CM

## 2024-06-28 PROCEDURE — 99213 OFFICE O/P EST LOW 20 MIN: CPT | Performed by: PHYSICIAN ASSISTANT

## 2024-06-28 PROCEDURE — G2211 COMPLEX E/M VISIT ADD ON: HCPCS | Performed by: PHYSICIAN ASSISTANT

## 2024-06-28 RX ORDER — FAMOTIDINE 20 MG/1
20 TABLET, FILM COATED ORAL 2 TIMES DAILY
Qty: 30 TABLET | Refills: 0 | Status: SHIPPED | OUTPATIENT
Start: 2024-06-28 | End: 2024-07-13

## 2024-06-28 ASSESSMENT — PAIN SCALES - GENERAL: PAINLEVEL: NO PAIN (0)

## 2024-06-28 NOTE — PATIENT INSTRUCTIONS
It was a pleasure taking care of you today.  I've included a brief summary of our discussion and care plan from today's visit below.  Please review this information with your primary care provider.  _______________________________________________________________________    My recommendations are summarized as follows:    Finish the omeprazole. In the next few weeks, you may have rebound heartburn symptoms. That is normal and expected. You can take famotidine 20 mg twice a day on an as needed basis. This can be bought over the counter as well.   Repeat the MRI in late Nov or Dec 2024. 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 [employer] messages are answered by your nurse or doctor typically within 24 hours.  Please allow extra time on weekends and holidays    Return to GI Clinic in  PRN months to review your progress.    _______________________________________________________________________    How do I schedule labs, imaging studies, or procedures that were ordered in clinic today?      Labs: To schedule lab appointment at the [facility], use my chart or call [phone]. If you have a [place] lab closer to home where you are regularly seen you can give them a call.      Procedures: If a colonoscopy, upper endoscopy, breath test, esophageal manometry, or pH impedence was ordered today, our endoscopy team will call you to schedule this. If you have not heard from our endoscopy team within a week, please call [phone] option 2 to schedule.      Imaging Studies: If you were scheduled for a CT scan, X-ray, MRI, ultrasound, HIDA scan or other imaging study, please call [phone] to have this scheduled.      Referral: If a referral to another specialty was ordered,  expect a phone call or follow instructions above. If you have not heard from anyone regarding your referral in a week, please call our clinic to check the status.      Who do I call with any questions after my visit?  Please be in touch if there are any further questions that arise following today's visit.  There are multiple ways to contact your gastroenterology care team.       During business hours, you may reach a Gastroenterology nurse at [phone]     To schedule or reschedule an appointment, please call [phone].      You can always send a secure message through shenzhoufu.  shenzhoufu messages are answered by your nurse or doctor typically within 24 hours.  Please allow extra time on weekends and holidays.       For urgent/emergent questions after business hours, you may reach the on-call GI Fellow by contacting the [facility]  at [phone].     How will I get the results of any tests ordered?    You will receive all of your results.  If you have signed up for [employer], any tests ordered at your visit will be available to you after your physician reviews them.  Typically this takes 1-2 weeks.  If there are urgent results that require a change in your care plan, your physician or nurse will call you to discuss the next steps.       What is shenzhoufu?  shenzhoufu is a secure way for you to access all of your healthcare records from the [facility].  It is a web based computer program, so you can sign on to it from any location.  It also allows you to send secure messages to your care team.  I recommend signing up for shenzhoufu access if you have not already done so and are comfortable with using a computer.       How to I schedule a follow-up visit?  If you did not schedule a follow-up visit today, please call [phone] to schedule a follow-up office visit.      Sincerely,    Carola Irving PA-C  Gastroenterology

## 2024-06-28 NOTE — PROGRESS NOTES
GI CLINIC VISIT    ERICK High is a 33 year old year old female with no significant PMHx following with the [facility] GI group for epigastric abd pain.     In summary she was seen with me 3/2024 to establish care for epigastric abdominal pain that improved with short course of PPI.  H. pylori was negative.  She was started on a therapeutic 3-month trial of PPI.  In this workup, we obtained a right upper quadrant ultrasound that showed a liver lesion.  CT liver and ultimately MR liver readings favors a benign etiology - 1.3 cm. Case was discussed with hepatology team who agree with radiology read and advised a repeat MR in 6 months for surveillance for possible hepatic adenoma.     Today 6/28/24  Overall doing really well.  She has 1 day left of omeprazole 40 mg.  On this medication her symptoms of epigastric abdominal pain associated with heartburn have completely resolved.  She is very pleased with this development.  She has not had to use any rescue medications. No fever, chill, nausea/vomiting, dysphagia, odynphagia.   Appetite and weight are stable  BM - 1-2x/d, normal and formed. [place] 3-4. No blood or black in stools. Once every few weeks will have a bristol 1.  She got a Mirena IUD late Dec 2023 and recently got removed given the liver findings.  She has never had a history of hepatitis, cirrhosis or family history of liver disease.  She denies personal malignancy history.   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       3/2024 appt w/ me   1.  3 to 4 weeks ago started with epigastric pain and heartburn symptoms.  Pain is described as a sharp nonradiating sensation, rated 6/10 in severity. She was then given pepcid which she took for 14d with significant improvement in her symptoms, pain level going down to 2-4/10 and no longer having any heartburn.  Pain is now intermittent and does not  last the entire day.  It typically follows 1 to 2 hours after dinner. Known trigger foods- spicy, sour foods  She has had intermittent epigastric discomfort since her 20s with sx resolution after drinking milk, most recently in past month, milk has not helped.   She did get an IUD in 12/2023 and up until Feb, she was taking mult doses of ibuprofen 2-400 mg 2-3x a week for pain relief. She is not taking NSAIDs now. No ETOH or smoking.   She is from [place]. There is no known fhx of gastric Ca or GI Ca.   No fever, chill, nausea/vomiting, dysphagia, odynphagia    2. BM - 1-2x/d, normal and formed. [place] 3-4. No blood or black in stools. Once every few weeks will have a bristol 1.     Weight - stable   Appetite - stable      Family Hx  Mom - with reflux troubles   No other known family history or GI related malignancy (esophageal, gastric, pancreatic, liver or colon) or family history of IBD/celiac disease.     Social Hx   No current use of NSAIDs or Tylenol.     No ETOH  No tobacco products.   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)   [employer]    PROBLEM LIST  Patient Active Problem List    Diagnosis Date Noted    Epigastric pain 06/10/2024     Priority: Medium    Heart burn 06/10/2024     Priority: Medium    Liver mass, left lobe 06/10/2024     Priority: Medium    Dysgeusia 06/10/2024     Priority: Medium     salty taste      Paresthesias 06/10/2024     Priority: Medium     PERTINENT PAST MEDICAL HISTORY:  Past Medical History:   Diagnosis Date    Suprapubic pain 06/10/2024    resolved once iud removed 4/2024         PREVIOUS SURGERIES:  Past Surgical History:   Procedure Laterality Date    WISDOM TOOTH EXTRACTION      2 removed age 19, then 3rd age 22, 4th removed 2023       ALLERGIES:     Allergies   Allergen Reactions    Dust Mites Itching    Pollen Extract        PERTINENT MEDICATIONS:    Current Outpatient Medications:     mvw complete formulation  (CHEWABLES) tablet, Take 1 tablet by mouth daily, Disp: , Rfl:     omeprazole (PRILOSEC) 40 MG DR capsule, Take 1 capsule (40 mg) by mouth daily 30-60 min before breakfast, Disp: 90 capsule, Rfl: 0    SOCIAL HISTORY:  Social History     Socioeconomic History    Marital status: Single     Spouse name: Not on file    Number of children: Not on file    Years of education: Not on file    Highest education level: Not on file   Occupational History    Not on file   Tobacco Use    Smoking status: Never    Smokeless tobacco: Never   Vaping Use    Vaping status: Never Used   Substance and Sexual Activity    Alcohol use: Never    Drug use: Never    Sexual activity: Yes     Partners: Male     Birth control/protection: Condom   Other Topics Concern    Parent/sibling w/ CABG, MI or angioplasty before 65F 55M? No   Social History Narrative    6/2024: moved to [place] with boyfriend. Dissertation next year. Summer vacation. Travel to [place] and [place]         Mar 2024: living with roommate,  on weekend with boyfriend    PHD student at the  , on precrastination     Social Determinants of Health     Financial Resource Strain: Low Risk  (6/7/2024)    Financial Resource Strain     Within the past 12 months, have you or your family members you live with been unable to get utilities (heat, electricity) when it was really needed?: No   Food Insecurity: Low Risk  (6/7/2024)    Food Insecurity     Within the past 12 months, did you worry that your food would run out before you got money to buy more?: No     Within the past 12 months, did the food you bought just not last and you didn t have money to get more?: No   Transportation Needs: Low Risk  (6/7/2024)    Transportation Needs     Within the past 12 months, has lack of transportation kept you from medical appointments, getting your medicines, non-medical meetings or appointments, work, or from getting things that you need?: No   Physical Activity: Insufficiently Active (6/7/2024)     "Exercise Vital Sign     Days of Exercise per Week: 1 day     Minutes of Exercise per Session: 20 min   Stress: Stress Concern Present (6/7/2024)    [employer] [place] [employer] - Occupational Stress Questionnaire     Feeling of Stress : To some extent   Social Connections: Unknown (6/7/2024)    Social Connection and Isolation Panel [NHANES]     Frequency of Communication with Friends and Family: Not on file     Frequency of Social Gatherings with Friends and Family: Once a week     Attends Synagogue Services: Not on file     Active Member of Clubs or Organizations: Not on file     Attends Club or Organization Meetings: Not on file     Marital Status: Not on file   Interpersonal Safety: Low Risk  (6/10/2024)    Interpersonal Safety     Do you feel physically and emotionally safe where you currently live?: Yes     Within the past 12 months, have you been hit, slapped, kicked or otherwise physically hurt by someone?: No     Within the past 12 months, have you been humiliated or emotionally abused in other ways by your partner or ex-partner?: No   Housing Stability: High Risk (6/7/2024)    Housing Stability     Do you have housing? : No     Are you worried about losing your housing?: No       FAMILY HISTORY:  Family History   Problem Relation Age of Onset    Gastrointestinal Disease Mother         sensitive stomach    Hypertension Mother     Glaucoma Mother     Hypertension Father     Hyperlipidemia Father        Past/family/social history reviewed and no changes    PHYSICAL EXAMINATION:  Vitals reviewed: /76   Pulse 75   Ht 1.549 m (5' 1\")   Wt 47.2 kg (104 lb)   LMP 05/28/2024 (Exact Date)   SpO2 97%   BMI 19.65 kg/m    Wt:   Wt Readings from Last 2 Encounters:   06/28/24 47.2 kg (104 lb)   06/10/24 47 kg (103 lb 9.6 oz)      General: Patient appears well in no acute distress.   Skin: No visualized rash or lesions on visualized skin  ENT: EOMI, no erythema, sclera icterus or discharge noted. " MMM without lesions or thrush.  Resp: . Appears to be breathing comfortably without accessory muscle usage, speaking in full sentences, no cough  CV: RRR  GI: soft, non-distended, non-tender.   MSK: Appears to have normal range of motion based on visualized movements  Neurologic: No apparent tremors, facial movements symmetric, CN II-XII grossly intact  Psych: affect normal, alert and oriented      PERTINENT STUDIES:    Office Visit on 03/08/2024   Component Date Value Ref Range Status    HIV Antigen Antibody Combo 03/08/2024 Nonreactive  Nonreactive Final    Hepatitis C Antibody 03/08/2024 Nonreactive  Nonreactive Final    Color Urine 03/08/2024 Yellow  Colorless, Straw, Light Yellow, Yellow Final    Appearance Urine 03/08/2024 Slightly Cloudy (A)  Clear Final    Glucose Urine 03/08/2024 Negative  Negative mg/dL Final    Bilirubin Urine 03/08/2024 Negative  Negative Final    Ketones Urine 03/08/2024 Negative  Negative mg/dL Final    Specific Gravity Urine 03/08/2024 1.025  1.003 - 1.035 Final    Blood Urine 03/08/2024 Trace (A)  Negative Final    pH Urine 03/08/2024 6.0  5.0 - 7.0 Final    Protein Albumin Urine 03/08/2024 Negative  Negative mg/dL Final    Urobilinogen Urine 03/08/2024 0.2  0.2, 1.0 E.U./dL Final    Nitrite Urine 03/08/2024 Negative  Negative Final    Leukocyte Esterase Urine 03/08/2024 Negative  Negative Final    Tissue Transglutaminase Antibody I* 03/08/2024 1.0  <7.0 U/mL Final    Tissue Transglutaminase Antibody I* 03/08/2024 <0.6  <7.0 U/mL Final    Sodium 03/08/2024 136  135 - 145 mmol/L Final    Potassium 03/08/2024 4.1  3.4 - 5.3 mmol/L Final    Carbon Dioxide (CO2) 03/08/2024 26  22 - 29 mmol/L Final    Anion Gap 03/08/2024 8  7 - 15 mmol/L Final    Urea Nitrogen 03/08/2024 13.5  6.0 - 20.0 mg/dL Final    Creatinine 03/08/2024 0.64  0.51 - 0.95 mg/dL Final    GFR Estimate 03/08/2024 >90  >60 mL/min/1.73m2 Final    Calcium 03/08/2024 8.9  8.6 - 10.0 mg/dL Final    Chloride 03/08/2024 102  98  - 107 mmol/L Final    Glucose 03/08/2024 93  70 - 99 mg/dL Final    Alkaline Phosphatase 03/08/2024 54  40 - 150 U/L Final    AST 03/08/2024 15  0 - 45 U/L Final    ALT 03/08/2024 17  0 - 50 U/L Final    Protein Total 03/08/2024 7.7  6.4 - 8.3 g/dL Final    Albumin 03/08/2024 4.2  3.5 - 5.2 g/dL Final    Bilirubin Total 03/08/2024 0.5  <=1.2 mg/dL Final    TSH 03/08/2024 2.02  0.30 - 4.20 uIU/mL Final    Lipase 03/08/2024 62 (H)  13 - 60 U/L Final    Helicobacter pylori Antigen Stool 03/09/2024 Negative  Negative Final    WBC Count 03/08/2024 6.8  4.0 - 11.0 10e3/uL Final    RBC Count 03/08/2024 4.40  3.80 - 5.20 10e6/uL Final    Hemoglobin 03/08/2024 13.1  11.7 - 15.7 g/dL Final    Hematocrit 03/08/2024 38.8  35.0 - 47.0 % Final    MCV 03/08/2024 88  78 - 100 fL Final    MCH 03/08/2024 29.8  26.5 - 33.0 pg Final    MCHC 03/08/2024 33.8  31.5 - 36.5 g/dL Final    RDW 03/08/2024 12.5  10.0 - 15.0 % Final    Platelet Count 03/08/2024 217  150 - 450 10e3/uL Final    % Neutrophils 03/08/2024 65  % Final    % Lymphocytes 03/08/2024 23  % Final    % Monocytes 03/08/2024 10  % Final    % Eosinophils 03/08/2024 2  % Final    % Basophils 03/08/2024 1  % Final    % Immature Granulocytes 03/08/2024 0  % Final    Absolute Neutrophils 03/08/2024 4.4  1.6 - 8.3 10e3/uL Final    Absolute Lymphocytes 03/08/2024 1.5  0.8 - 5.3 10e3/uL Final    Absolute Monocytes 03/08/2024 0.7  0.0 - 1.3 10e3/uL Final    Absolute Eosinophils 03/08/2024 0.1  0.0 - 0.7 10e3/uL Final    Absolute Basophils 03/08/2024 0.1  0.0 - 0.2 10e3/uL Final    Absolute Immature Granulocytes 03/08/2024 0.0  <=0.4 10e3/uL Final    Bacteria Urine 03/08/2024 Few (A)  None Seen /HPF Final    RBC Urine 03/08/2024 None Seen  0-2 /HPF /HPF Final    WBC Urine 03/08/2024 0-5  0-5 /HPF /HPF Final    Squamous Epithelials Urine 03/08/2024 Moderate (A)  None Seen /LPF Final        Lab Results   Component Value Date    WBC 6.8 03/08/2024    HGB 13.1 03/08/2024      03/08/2024    CHOL 165 06/10/2024    TRIG 121 06/10/2024    HDL 49 (L) 06/10/2024    ALT 17 03/08/2024    ALT 31 09/01/2023    AST 15 03/08/2024    AST 20 09/01/2023     03/08/2024    BUN 13.5 03/08/2024    CO2 26 03/08/2024    TSH 2.02 03/08/2024        Liver Function Studies -   Recent Labs   Lab Test 03/08/24  1516   PROTTOTAL 7.7   ALBUMIN 4.2   BILITOTAL 0.5   ALKPHOS 54   AST 15   ALT 17        PREVIOUS ENDOSCOPY    No results found for this or any previous visit.    ASSESSMENT/PLAN:  Sandi High is a 33 year old year old female with no significant PMHx  presenting to establish care with the [facility] GI group for epigastric abd pain. Her sx improved significantly with 14d of pepcid. she was taking NSAIDs mult times a week but has since stopped in early March. Weight and appetite are stable. Never smoker.     At our 3/2024 appt, she was started on a therapeutic 3-month trial of PPI.  Additionally in this workup, we obtained a right upper quadrant ultrasound that showed a liver lesion.  CT liver and ultimately MR liver readings favors a benign etiology - 1.3 cm. Case was discussed with hepatology team who agree with radiology read and advised a repeat MR in 6 months for surveillance for possible hepatic adenoma.     3/2024 -   H.pylori stool antigen NEG  TSH, CMP, CBC WNL  Lipase 62 (NL <60)  TTG Ab NEG     #epigastric abd pain  #heartburn   Sx resolution after 3-month therapeutic trial of omeprazole 40 mg once daily highly suggestive of GERD/gastritis (thought provoked by NSAID use, which she is no longer taking).     Occ flare if she eats a known trigger food - OK for PRN pepcid. Will Rx 14d course of pepcid for any rebound reflux sx with discontinuation of PPI   -With symptom resolution, no further workup indicated  -Continue antireflux dietary lifestyle modifications    Future consideration  1.if symptoms were to recur, EGD can be considered    #liver lesion  1.3 cm on MR W and WO contrast. Case discussed  with hepatology colleagues, thought benign etiology given low risk profile and radiologic impressions. They advised repeat MR in 6 mo - ordered today.   - no longer with Mirena IUD     Orders Placed This Encounter   Procedures    MR Liver wo & w Contrast     Colorectal cancer screening: aged 45, unless otherwise clinically indicated     RTC - we discussed RTC to GEN GI PRN     Thank you for this consultation.  It was a pleasure to participate in the care of this patient; please contact me with any further questions.     Carola Irving PA-C    Follow up: As planned above. Today, I personally spent 28 minutes in direct face to face time with the patient, of which greater than 50% of the time was spent in patient education and counseling as described above. Approximately  minutes were spent on indirect care associated with the patient's consultation including but not limited to review of: patient medical records to date, clinic visits, hospital records, lab results, imaging studies, procedural documentation, and coordinating care with other providers. The findings from this review are summarized in the above note. All of the above accounted for a cumulative time of 28 minutes and was performed on the date of service.

## 2024-06-28 NOTE — LETTER
6/28/2024      Sandi High  [address]  [facility] [address]      Dear Colleague,    Thank you for referring your patient, Sandi High, to the [facility] [place]. Please see a copy of my visit note below.        GI CLINIC VISIT    HPI  Sandi High is a 33 year old year old female with no significant PMHx following with the [facility] GI group for epigastric abd pain.     In summary she was seen with me 3/2024 to establish care for epigastric abdominal pain that improved with short course of PPI.  H. pylori was negative.  She was started on a therapeutic 3-month trial of PPI.  In this workup, we obtained a right upper quadrant ultrasound that showed a liver lesion.  CT liver and ultimately MR liver readings favors a benign etiology - 1.3 cm. Case was discussed with hepatology team who agree with radiology read and advised a repeat MR in 6 months for surveillance for possible hepatic adenoma.     Today 6/28/24  Overall doing really well.  She has 1 day left of omeprazole 40 mg.  On this medication her symptoms of epigastric abdominal pain associated with heartburn have completely resolved.  She is very pleased with this development.  She has not had to use any rescue medications. No fever, chill, nausea/vomiting, dysphagia, odynphagia.   Appetite and weight are stable  BM - 1-2x/d, normal and formed. [place] 3-4. No blood or black in stools. Once every few weeks will have a bristol 1.  She got a Mirena IUD late Dec 2023 and recently got removed given the liver findings.  She has never had a history of hepatitis, cirrhosis or family history of liver disease.  She denies personal malignancy history.   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       3/2024 appt w/ me   1.  3 to 4 weeks ago started with epigastric pain and heartburn symptoms.  Pain is described as a sharp  nonradiating sensation, rated 6/10 in severity. She was then given pepcid which she took for 14d with significant improvement in her symptoms, pain level going down to 2-4/10 and no longer having any heartburn.  Pain is now intermittent and does not last the entire day.  It typically follows 1 to 2 hours after dinner. Known trigger foods- spicy, sour foods  She has had intermittent epigastric discomfort since her 20s with sx resolution after drinking milk, most recently in past month, milk has not helped.   She did get an IUD in 12/2023 and up until Feb, she was taking mult doses of ibuprofen 2-400 mg 2-3x a week for pain relief. She is not taking NSAIDs now. No ETOH or smoking.   She is from [place]. There is no known fhx of gastric Ca or GI Ca.   No fever, chill, nausea/vomiting, dysphagia, odynphagia    2. BM - 1-2x/d, normal and formed. [place] 3-4. No blood or black in stools. Once every few weeks will have a bristol 1.     Weight - stable   Appetite - stable      Family Hx  Mom - with reflux troubles   No other known family history or GI related malignancy (esophageal, gastric, pancreatic, liver or colon) or family history of IBD/celiac disease.     Social Hx   No current use of NSAIDs or Tylenol.     No ETOH  No tobacco products.   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)   U    PROBLEM LIST  Patient Active Problem List    Diagnosis Date Noted    Epigastric pain 06/10/2024     Priority: Medium    Heart burn 06/10/2024     Priority: Medium    Liver mass, left lobe 06/10/2024     Priority: Medium    Dysgeusia 06/10/2024     Priority: Medium     salty taste      Paresthesias 06/10/2024     Priority: Medium     PERTINENT PAST MEDICAL HISTORY:  Past Medical History:   Diagnosis Date    Suprapubic pain 06/10/2024    resolved once iud removed 4/2024         PREVIOUS SURGERIES:  Past Surgical History:   Procedure Laterality Date    WISDOM TOOTH  EXTRACTION      2 removed age 19, then 3rd age 22, 4th removed 2023       ALLERGIES:     Allergies   Allergen Reactions    Dust Mites Itching    Pollen Extract        PERTINENT MEDICATIONS:    Current Outpatient Medications:     mvw complete formulation (CHEWABLES) tablet, Take 1 tablet by mouth daily, Disp: , Rfl:     omeprazole (PRILOSEC) 40 MG DR capsule, Take 1 capsule (40 mg) by mouth daily 30-60 min before breakfast, Disp: 90 capsule, Rfl: 0    SOCIAL HISTORY:  Social History     Socioeconomic History    Marital status: Single     Spouse name: Not on file    Number of children: Not on file    Years of education: Not on file    Highest education level: Not on file   Occupational History    Not on file   Tobacco Use    Smoking status: Never    Smokeless tobacco: Never   Vaping Use    Vaping status: Never Used   Substance and Sexual Activity    Alcohol use: Never    Drug use: Never    Sexual activity: Yes     Partners: Male     Birth control/protection: Condom   Other Topics Concern    Parent/sibling w/ CABG, MI or angioplasty before 65F 55M? No   Social History Narrative    6/2024: moved to [place] with boyfriend. Dissertation next year. Summer vacation. Travel to [place] and [place]         Mar 2024: living with roommate,  on weekend with boyfriend    PHD student at the  , on precrastination     Social Determinants of Health     Financial Resource Strain: Low Risk  (6/7/2024)    Financial Resource Strain     Within the past 12 months, have you or your family members you live with been unable to get utilities (heat, electricity) when it was really needed?: No   Food Insecurity: Low Risk  (6/7/2024)    Food Insecurity     Within the past 12 months, did you worry that your food would run out before you got money to buy more?: No     Within the past 12 months, did the food you bought just not last and you didn t have money to get more?: No   Transportation Needs: Low Risk  (6/7/2024)    Transportation Needs      "Within the past 12 months, has lack of transportation kept you from medical appointments, getting your medicines, non-medical meetings or appointments, work, or from getting things that you need?: No   Physical Activity: Insufficiently Active (6/7/2024)    Exercise Vital Sign     Days of Exercise per Week: 1 day     Minutes of Exercise per Session: 20 min   Stress: Stress Concern Present (6/7/2024)    [employer] [place] [employer] - Occupational Stress Questionnaire     Feeling of Stress : To some extent   Social Connections: Unknown (6/7/2024)    Social Connection and Isolation Panel [NHANES]     Frequency of Communication with Friends and Family: Not on file     Frequency of Social Gatherings with Friends and Family: Once a week     Attends Anabaptism Services: Not on file     Active Member of Clubs or Organizations: Not on file     Attends Club or Organization Meetings: Not on file     Marital Status: Not on file   Interpersonal Safety: Low Risk  (6/10/2024)    Interpersonal Safety     Do you feel physically and emotionally safe where you currently live?: Yes     Within the past 12 months, have you been hit, slapped, kicked or otherwise physically hurt by someone?: No     Within the past 12 months, have you been humiliated or emotionally abused in other ways by your partner or ex-partner?: No   Housing Stability: High Risk (6/7/2024)    Housing Stability     Do you have housing? : No     Are you worried about losing your housing?: No       FAMILY HISTORY:  Family History   Problem Relation Age of Onset    Gastrointestinal Disease Mother         sensitive stomach    Hypertension Mother     Glaucoma Mother     Hypertension Father     Hyperlipidemia Father        Past/family/social history reviewed and no changes    PHYSICAL EXAMINATION:  Vitals reviewed: /76   Pulse 75   Ht 1.549 m (5' 1\")   Wt 47.2 kg (104 lb)   LMP 05/28/2024 (Exact Date)   SpO2 97%   BMI 19.65 kg/m    Wt:   Wt Readings from " Last 2 Encounters:   06/28/24 47.2 kg (104 lb)   06/10/24 47 kg (103 lb 9.6 oz)      General: Patient appears well in no acute distress.   Skin: No visualized rash or lesions on visualized skin  ENT: EOMI, no erythema, sclera icterus or discharge noted. MMM without lesions or thrush.  Resp: . Appears to be breathing comfortably without accessory muscle usage, speaking in full sentences, no cough  CV: RRR  GI: soft, non-distended, non-tender.   MSK: Appears to have normal range of motion based on visualized movements  Neurologic: No apparent tremors, facial movements symmetric, CN II-XII grossly intact  Psych: affect normal, alert and oriented      PERTINENT STUDIES:    Office Visit on 03/08/2024   Component Date Value Ref Range Status    HIV Antigen Antibody Combo 03/08/2024 Nonreactive  Nonreactive Final    Hepatitis C Antibody 03/08/2024 Nonreactive  Nonreactive Final    Color Urine 03/08/2024 Yellow  Colorless, Straw, Light Yellow, Yellow Final    Appearance Urine 03/08/2024 Slightly Cloudy (A)  Clear Final    Glucose Urine 03/08/2024 Negative  Negative mg/dL Final    Bilirubin Urine 03/08/2024 Negative  Negative Final    Ketones Urine 03/08/2024 Negative  Negative mg/dL Final    Specific Gravity Urine 03/08/2024 1.025  1.003 - 1.035 Final    Blood Urine 03/08/2024 Trace (A)  Negative Final    pH Urine 03/08/2024 6.0  5.0 - 7.0 Final    Protein Albumin Urine 03/08/2024 Negative  Negative mg/dL Final    Urobilinogen Urine 03/08/2024 0.2  0.2, 1.0 E.U./dL Final    Nitrite Urine 03/08/2024 Negative  Negative Final    Leukocyte Esterase Urine 03/08/2024 Negative  Negative Final    Tissue Transglutaminase Antibody I* 03/08/2024 1.0  <7.0 U/mL Final    Tissue Transglutaminase Antibody I* 03/08/2024 <0.6  <7.0 U/mL Final    Sodium 03/08/2024 136  135 - 145 mmol/L Final    Potassium 03/08/2024 4.1  3.4 - 5.3 mmol/L Final    Carbon Dioxide (CO2) 03/08/2024 26  22 - 29 mmol/L Final    Anion Gap 03/08/2024 8  7 - 15 mmol/L  Final    Urea Nitrogen 03/08/2024 13.5  6.0 - 20.0 mg/dL Final    Creatinine 03/08/2024 0.64  0.51 - 0.95 mg/dL Final    GFR Estimate 03/08/2024 >90  >60 mL/min/1.73m2 Final    Calcium 03/08/2024 8.9  8.6 - 10.0 mg/dL Final    Chloride 03/08/2024 102  98 - 107 mmol/L Final    Glucose 03/08/2024 93  70 - 99 mg/dL Final    Alkaline Phosphatase 03/08/2024 54  40 - 150 U/L Final    AST 03/08/2024 15  0 - 45 U/L Final    ALT 03/08/2024 17  0 - 50 U/L Final    Protein Total 03/08/2024 7.7  6.4 - 8.3 g/dL Final    Albumin 03/08/2024 4.2  3.5 - 5.2 g/dL Final    Bilirubin Total 03/08/2024 0.5  <=1.2 mg/dL Final    TSH 03/08/2024 2.02  0.30 - 4.20 uIU/mL Final    Lipase 03/08/2024 62 (H)  13 - 60 U/L Final    Helicobacter pylori Antigen Stool 03/09/2024 Negative  Negative Final    WBC Count 03/08/2024 6.8  4.0 - 11.0 10e3/uL Final    RBC Count 03/08/2024 4.40  3.80 - 5.20 10e6/uL Final    Hemoglobin 03/08/2024 13.1  11.7 - 15.7 g/dL Final    Hematocrit 03/08/2024 38.8  35.0 - 47.0 % Final    MCV 03/08/2024 88  78 - 100 fL Final    MCH 03/08/2024 29.8  26.5 - 33.0 pg Final    MCHC 03/08/2024 33.8  31.5 - 36.5 g/dL Final    RDW 03/08/2024 12.5  10.0 - 15.0 % Final    Platelet Count 03/08/2024 217  150 - 450 10e3/uL Final    % Neutrophils 03/08/2024 65  % Final    % Lymphocytes 03/08/2024 23  % Final    % Monocytes 03/08/2024 10  % Final    % Eosinophils 03/08/2024 2  % Final    % Basophils 03/08/2024 1  % Final    % Immature Granulocytes 03/08/2024 0  % Final    Absolute Neutrophils 03/08/2024 4.4  1.6 - 8.3 10e3/uL Final    Absolute Lymphocytes 03/08/2024 1.5  0.8 - 5.3 10e3/uL Final    Absolute Monocytes 03/08/2024 0.7  0.0 - 1.3 10e3/uL Final    Absolute Eosinophils 03/08/2024 0.1  0.0 - 0.7 10e3/uL Final    Absolute Basophils 03/08/2024 0.1  0.0 - 0.2 10e3/uL Final    Absolute Immature Granulocytes 03/08/2024 0.0  <=0.4 10e3/uL Final    Bacteria Urine 03/08/2024 Few (A)  None Seen /HPF Final    RBC Urine 03/08/2024 None  Seen  0-2 /HPF /HPF Final    WBC Urine 03/08/2024 0-5  0-5 /HPF /HPF Final    Squamous Epithelials Urine 03/08/2024 Moderate (A)  None Seen /LPF Final        Lab Results   Component Value Date    WBC 6.8 03/08/2024    HGB 13.1 03/08/2024     03/08/2024    CHOL 165 06/10/2024    TRIG 121 06/10/2024    HDL 49 (L) 06/10/2024    ALT 17 03/08/2024    ALT 31 09/01/2023    AST 15 03/08/2024    AST 20 09/01/2023     03/08/2024    BUN 13.5 03/08/2024    CO2 26 03/08/2024    TSH 2.02 03/08/2024        Liver Function Studies -   Recent Labs   Lab Test 03/08/24  1516   PROTTOTAL 7.7   ALBUMIN 4.2   BILITOTAL 0.5   ALKPHOS 54   AST 15   ALT 17        PREVIOUS ENDOSCOPY    No results found for this or any previous visit.    ASSESSMENT/PLAN:  Sandi High is a 33 year old year old female with no significant PMHx  presenting to establish care with the [facility] GI group for epigastric abd pain. Her sx improved significantly with 14d of pepcid. she was taking NSAIDs mult times a week but has since stopped in early March. Weight and appetite are stable. Never smoker.     At our 3/2024 appt, she was started on a therapeutic 3-month trial of PPI.  Additionally in this workup, we obtained a right upper quadrant ultrasound that showed a liver lesion.  CT liver and ultimately MR liver readings favors a benign etiology - 1.3 cm. Case was discussed with hepatology team who agree with radiology read and advised a repeat MR in 6 months for surveillance for possible hepatic adenoma.     3/2024 -   H.pylori stool antigen NEG  TSH, CMP, CBC WNL  Lipase 62 (NL <60)  TTG Ab NEG     #epigastric abd pain  #heartburn   Sx resolution after 3-month therapeutic trial of omeprazole 40 mg once daily highly suggestive of GERD/gastritis (thought provoked by NSAID use, which she is no longer taking).     Occ flare if she eats a known trigger food - OK for PRN pepcid. Will Rx 14d course of pepcid for any rebound reflux sx with discontinuation of PPI    -With symptom resolution, no further workup indicated  -Continue antireflux dietary lifestyle modifications    Future consideration  1.if symptoms were to recur, EGD can be considered    #liver lesion  1.3 cm on MR W and WO contrast. Case discussed with hepatology colleagues, thought benign etiology given low risk profile and radiologic impressions. They advised repeat MR in 6 mo - ordered today.   - no longer with Mirena IUD     Orders Placed This Encounter   Procedures    MR Liver wo & w Contrast     Colorectal cancer screening: aged 45, unless otherwise clinically indicated     RTC - we discussed RTC to GEN GI PRN     Thank you for this consultation.  It was a pleasure to participate in the care of this patient; please contact me with any further questions.         Follow up: As planned above. Today, I personally spent 28 minutes in direct face to face time with the patient, of which greater than 50% of the time was spent in patient education and counseling as described above. Approximately  minutes were spent on indirect care associated with the patient's consultation including but not limited to review of: patient medical records to date, clinic visits, hospital records, lab results, imaging studies, procedural documentation, and coordinating care with other providers. The findings from this review are summarized in the above note. All of the above accounted for a cumulative time of 28 minutes and was performed on the date of service.           Again, thank you for allowing me to participate in the care of your patient.      Sincerely,    Carola Irving PA-C

## 2024-06-28 NOTE — NURSING NOTE
"Chief Complaint   Patient presents with    Follow Up       Vitals:    06/28/24 1203   BP: 109/76   Pulse: 75   SpO2: 97%   Weight: 47.2 kg (104 lb)   Height: 1.549 m (5' 1\")       Body mass index is 19.65 kg/m .    Ita Cantu MA    "

## 2024-07-03 RX ORDER — OMEPRAZOLE 40 MG/1
40 CAPSULE, DELAYED RELEASE ORAL DAILY
Qty: 90 CAPSULE | Refills: 0 | Status: SHIPPED | OUTPATIENT
Start: 2024-07-03

## 2024-07-03 NOTE — TELEPHONE ENCOUNTER
LVD:  3/29/2024  [facility] Carola Castillo PA-C  Physician Assistant - [employer]     Refilled per protocol.

## 2024-08-14 ENCOUNTER — ANCILLARY PROCEDURE (OUTPATIENT)
Dept: GENERAL RADIOLOGY | Facility: CLINIC | Age: 34
End: 2024-08-14
Attending: FAMILY MEDICINE
Payer: COMMERCIAL

## 2024-08-14 ENCOUNTER — OFFICE VISIT (OUTPATIENT)
Dept: FAMILY MEDICINE | Facility: CLINIC | Age: 34
End: 2024-08-14
Payer: COMMERCIAL

## 2024-08-14 VITALS
RESPIRATION RATE: 16 BRPM | SYSTOLIC BLOOD PRESSURE: 119 MMHG | OXYGEN SATURATION: 98 % | DIASTOLIC BLOOD PRESSURE: 75 MMHG | WEIGHT: 105 LBS | TEMPERATURE: 98 F | HEART RATE: 76 BPM | BODY MASS INDEX: 19.84 KG/M2

## 2024-08-14 DIAGNOSIS — M54.2 ACUTE NECK PAIN: Primary | ICD-10-CM

## 2024-08-14 DIAGNOSIS — M54.2 ACUTE NECK PAIN: ICD-10-CM

## 2024-08-14 DIAGNOSIS — M54.6 ACUTE MIDLINE THORACIC BACK PAIN: ICD-10-CM

## 2024-08-14 PROCEDURE — 72040 X-RAY EXAM NECK SPINE 2-3 VW: CPT | Mod: TC | Performed by: RADIOLOGY

## 2024-08-14 PROCEDURE — 99214 OFFICE O/P EST MOD 30 MIN: CPT | Performed by: FAMILY MEDICINE

## 2024-08-14 PROCEDURE — 36415 COLL VENOUS BLD VENIPUNCTURE: CPT | Performed by: FAMILY MEDICINE

## 2024-08-14 PROCEDURE — 72070 X-RAY EXAM THORAC SPINE 2VWS: CPT | Mod: TC | Performed by: RADIOLOGY

## 2024-08-14 PROCEDURE — 86618 LYME DISEASE ANTIBODY: CPT | Performed by: FAMILY MEDICINE

## 2024-08-14 RX ORDER — CYCLOBENZAPRINE HCL 5 MG
5 TABLET ORAL 3 TIMES DAILY PRN
Qty: 30 TABLET | Refills: 0 | Status: SHIPPED | OUTPATIENT
Start: 2024-08-14

## 2024-08-14 NOTE — PATIENT INSTRUCTIONS
Ice the painful area for 20 minutes every few hours for the first couple days then as needed.   May apply heat but only 20 minutes at a time every couple hours. May follow with heat to lessen swelling.     Tylenol as needed for pain per instructions on bottle.     Cyclobenzaprine 5mg every 8 hours if needed for muscle spasms.     Lyme test is pending, we will call if positive to arrange treatment.     Recheck if worse or no better.

## 2024-08-15 LAB — B BURGDOR IGG+IGM SER QL: 0.18

## 2024-08-15 NOTE — PROGRESS NOTES
Assessment/Plan:   1. Acute neck pain  - XR Cervical Spine 2/3 Views; Future  - LYME DISEASE TOTAL ANTIBODIES WITH REFLEX TO CONFIRMATION  2. Acute midline thoracic back pain  - XR Thoracic Spine 2 Views; Future    Abrupt onset of sharp pain in thoracic and cervical areas of the spine. No apparent injury or overuse. No trigger motion noted.   Xray of cervical spine and thoracic spine were obtained and viewed by me. There was no compression fracture evident, no dislocation. There is loss of lordosis curves consistent with the muscle spasms on exam.   Lyme can lead to neck pain/torticollis therefore we will obtain blood test for lyme.     Ice the painful area for 20 minutes every few hours for the first couple days then as needed.   May apply heat but only 20 minutes at a time every couple hours. May follow with heat to lessen swelling.     Tylenol as needed for pain per instructions on bottle.     Cyclobenzaprine 5mg every 8 hours if needed for muscle spasms.     Lyme test is pending, we will call if positive to arrange treatment.     Recheck if worse or no better.     I discussed red flag symptoms, return precautions to clinic/ER and follow up care with patient/parent.  Expected clinical course, symptomatic cares advised. Questions answered. Patient/parent amenable with plan.      Subjective:     Sandi High is a 34 year old female who presents for evaluation of pain in the neck and mid back.   Abrupt onset this morning while walking. No apparent bend or twist. Felt fine getting out of bed in the morning beforehand.   Feels very tight and painful along the spine both sides from between the shoulder blades to the neck and base of the head.   Pain and stiffness with neck motion.   Tingling in the hands and feet though that has been intermittent for a long time. Has been told she had carpal tunnel and improved with the wrist braces at night but came back. Also has had both feet and both hands tingling during the days as  well.   No arm weakness. No pain into the legs or around to the front or into the chest.   No rash or dysesthesia in area of pain.   No fever or ST. No recent coughing.   Does some hiking, would have exposure to ticks but no known history of known tick bite.   No recent strenuous projects or exercise.   Pains are sharp with movement.   Her mother has history of osteoporosis with compression fractures and she is fearful of that.       Allergies   Allergen Reactions    Dust Mites Itching    Pollen Extract      Current Outpatient Medications   Medication Sig Dispense Refill    mvw complete formulation (CHEWABLES) tablet Take 1 tablet by mouth daily (Patient not taking: Reported on 8/14/2024)      omeprazole (PRILOSEC) 40 MG DR capsule Take 1 capsule (40 mg) by mouth daily 30-60 min before breakfast (Patient not taking: Reported on 8/14/2024) 90 capsule 0     No current facility-administered medications for this visit.     Patient Active Problem List   Diagnosis    Epigastric pain    Heart burn    Liver mass, left lobe    Dysgeusia    Paresthesias       Objective:     /75   Pulse 76   Temp 98  F (36.7  C)   Resp 16   Wt 47.6 kg (105 lb)   LMP 07/21/2024   SpO2 98%   BMI 19.84 kg/m      Physical    General Appearance: Alert, pleasant, no distress, aVSS  Head: Normocephalic, without obvious abnormality, atraumatic  Eyes: Conjunctivae are normal.   Nose: No significant congestion.  Neck: No adenopathy, tender cervical muscles and at the occipital insertion. Decreased range of motion in the neck in all directions.   Lungs: Clear to auscultation bilaterally, respirations unlabored  Heart: Regular rate and rhythm  Back: no pain with percussion of the spine. There is tender tight paraspinous muscles both sides of spine from scapulae to the neck. Waist range of motion okay, no lower back pain.   Extremities: No lower extremity edema, strength BUE and BLE symmetric and normal. No SLR.   Skin: no rashes or  lesions  Psychiatric: Patient has a normal mood and affect.         Results for orders placed or performed in visit on 08/14/24   XR Thoracic Spine 2 Views     Status: None    Narrative    EXAM: XR CERVICAL SPINE 2/3 VIEWS, XR THORACIC SPINE 2 VIEWS  LOCATION: [facility]  DATE: 8/14/2024    INDICATION: abrupt onset of neck and thoracic spine pain today, no trauma or injury  COMPARISON: None.      Impression    IMPRESSION: Straightening of the normal cervical lordosis and normal thoracic kyphosis. Vertebral body heights are maintained. No fracture. Normal discs and facets for age. Normal paraspinal soft tissues.     Results for orders placed or performed in visit on 08/14/24   XR Cervical Spine 2/3 Views     Status: None    Narrative    EXAM: XR CERVICAL SPINE 2/3 VIEWS, XR THORACIC SPINE 2 VIEWS  LOCATION: [facility]  DATE: 8/14/2024    INDICATION: abrupt onset of neck and thoracic spine pain today, no trauma or injury  COMPARISON: None.      Impression    IMPRESSION: Straightening of the normal cervical lordosis and normal thoracic kyphosis. Vertebral body heights are maintained. No fracture. Normal discs and facets for age. Normal paraspinal soft tissues.         This note has been dictated in part using voice recognition software.  Any grammatical or context distortions are unintentional and inherent to the software.  Please feel free to contact me directly for clarification if needed.

## 2024-11-09 ENCOUNTER — ANCILLARY PROCEDURE (OUTPATIENT)
Dept: GENERAL RADIOLOGY | Facility: CLINIC | Age: 34
End: 2024-11-09
Attending: PHYSICIAN ASSISTANT
Payer: COMMERCIAL

## 2024-11-09 ENCOUNTER — OFFICE VISIT (OUTPATIENT)
Dept: ORTHOPEDICS | Facility: CLINIC | Age: 34
End: 2024-11-09
Payer: COMMERCIAL

## 2024-11-09 DIAGNOSIS — M25.531 RIGHT WRIST PAIN: ICD-10-CM

## 2024-11-09 DIAGNOSIS — M25.531 RIGHT WRIST PAIN: Primary | ICD-10-CM

## 2024-11-09 PROCEDURE — 73110 X-RAY EXAM OF WRIST: CPT | Mod: TC | Performed by: STUDENT IN AN ORGANIZED HEALTH CARE EDUCATION/TRAINING PROGRAM

## 2024-11-09 PROCEDURE — 99203 OFFICE O/P NEW LOW 30 MIN: CPT | Performed by: PHYSICIAN ASSISTANT

## 2024-11-09 NOTE — LETTER
11/9/2024      Sandi High  [address]  [facility] [address]      Dear Colleague,    Thank you for referring your patient, Sandi High, to the [facility]. Please see a copy of my visit note below.    ASSESSMENT & PLAN     Today we discussed the underlying etiology/pathology of patient's   1. Right wrist pain- TFCC area      Discussed diagnosis and treatment options with the patient today. A shared decision making model was used. The patient's values and choices were respected. The following represents what was discussed and decided upon by the provider and the patient.   -We discussed the patient sustained an injury to her right wrist in childhood with a fall and an outstretched hand mechanism.  Since then she has had intermittent reproduction of symptoms over the TFCC region/ulnar styloid process of the wrist.  Activities that seem to aggravated this condition was playing violin in her earlier years which she currently does not do.  Symptoms now have reappeared over the last 2 to 3 days with no known particular injury  - X-rays reviewed today showing no evidence of fracture, dislocation, arthritis or other abnormality  - We discussed that she has likely soft tissue irritation in the TFCC region  - We discussed the mainstays of treatment for this which include appropriate rest, immobilization if needed with a wrist brace, use of anti-inflammatory medicines to bring down inflammation and pain and appropriate occupational therapy rehab program for wrist strengthening and stabilization  - We discussed that if all conservative treatments fail that MRI of the wrist could be obtained to evaluate for structural abnormality to consider localized corticosteroid injection or surgical intervention  - Patient at this time will purchase over-the-counter Voltaren gel and use it topically.  She will apply it once every 6-8 hours making sure she does not wash it off.  She will  continue with this until she becomes asymptomatic.  She already has a wrist brace that she can wear as needed.  - Patient will be referred to occupational therapy and will be contacted early next week to find mutual times to meet.  In the meantime she will check with her insurance company to make sure that she has appropriate healthcare coverage for occupational therapy sessions  - If patient does not continue to get good symptomatic control or relief with above treatment plan she may follow-up for repeat assessment and consideration of ordering right wrist MRI    -Call direct clinic number [[phone]] at any time with questions or concerns in regards to your recent office visit with me.     Javi Gimenez PA-C  [facility]    This note was completed in part using a voice recognition software, any grammatical or context distortion are unintentional and inherent to the software.     OVER THE COUNTER MEDICATIONS  Insurance usually does not cover these. Please check your local drug store and/or [employer].    Topical lidocaine gel                   SUBJECTIVE  Sandi High is a/an 34 year old female who is seen for acute on chronic intermittent right wrist pain.  No acute injury or trauma.  Patient states that in childhood she fell on her right wrist causing pain and discomfort.  Since then she has had intermittent symptoms always isolated over the distal ulnar styloid process/TFCC region with activities such as playing violin.  She has a wrist brace that she wears to help minimize symptoms as needed.  She has not tried any oral medications, topical medications rest or ice otherwise for her condition.  No previous history of surgery or treatment for this condition.  She denies any numbness or tingling of her digits.    Past Medical History:   Diagnosis Date     Suprapubic pain 06/10/2024    resolved once iud removed 4/2024       Social History     Socioeconomic History     Marital status: Single    Tobacco Use     Smoking status: Never     Smokeless tobacco: Never   Vaping Use     Vaping status: Never Used   Substance and Sexual Activity     Alcohol use: Never     Drug use: Never     Sexual activity: Yes     Partners: Male     Birth control/protection: Condom   Other Topics Concern     Parent/sibling w/ CABG, MI or angioplasty before 65F 55M? No   Social History Narrative    6/2024: moved to [place] with boyfriend. Dissertation next year. Summer vacation. Travel to [place] and [place]         Mar 2024: living with roommate,  on weekend with boyfriend    PHD student at the  , on precrastination     Social Drivers of Health     Financial Resource Strain: Low Risk  (6/7/2024)    Financial Resource Strain      Within the past 12 months, have you or your family members you live with been unable to get utilities (heat, electricity) when it was really needed?: No   Food Insecurity: Low Risk  (6/7/2024)    Food Insecurity      Within the past 12 months, did you worry that your food would run out before you got money to buy more?: No      Within the past 12 months, did the food you bought just not last and you didn t have money to get more?: No   Transportation Needs: Low Risk  (6/7/2024)    Transportation Needs      Within the past 12 months, has lack of transportation kept you from medical appointments, getting your medicines, non-medical meetings or appointments, work, or from getting things that you need?: No   Physical Activity: Insufficiently Active (6/7/2024)    Exercise Vital Sign      Days of Exercise per Week: 1 day      Minutes of Exercise per Session: 20 min   Stress: Stress Concern Present (6/7/2024)    [employer] - Occupational Stress Questionnaire      Feeling of Stress : To some extent   Social Connections: Unknown (6/7/2024)    Social Connection and Isolation Panel [NHANES]      Frequency of Social Gatherings with Friends and Family: Once a week   Interpersonal Safety:  Low Risk  (6/10/2024)    Interpersonal Safety      Do you feel physically and emotionally safe where you currently live?: Yes      Within the past 12 months, have you been hit, slapped, kicked or otherwise physically hurt by someone?: No      Within the past 12 months, have you been humiliated or emotionally abused in other ways by your partner or ex-partner?: No   Housing Stability: High Risk (6/7/2024)    Housing Stability      Do you have housing? : No      Are you worried about losing your housing?: No         Patient's past medical, surgical, social, and family histories were personally reviewed today and no changes are noted.    REVIEW OF SYSTEMS:  10 point ROS is negative other than symptoms noted above in HPI, Past Medical History or as stated below  Constitutional: NEGATIVE for fever, chills, change in weight  Skin: NEGATIVE for worrisome rashes, moles or lesions  GI/: NEGATIVE for bowel or bladder changes  Neuro: NEGATIVE for weakness, dizziness or paresthesias    OBJECTIVE:  Vital signs as noted in EPIC for 11/9/2024  General: healthy, alert and in no distress  HEENT: no scleral icterus or conjunctival erythema  Skin: no suspicious lesions or rash. No jaundice.  CV: no pedal edema  Resp: normal respiratory effort without conversational dyspnea   Psych: normal mood and affect  Neuro: Normal light sensory exam of lower extremity      MSK:  Exam shows a pleasant 34-year-old female who ambulates full weightbearing without assistive device.  She is alert and oriented x 3.  Examination of the right hand and wrist shows no obvious bruising, swelling or ecchymosis.  No previous surgical incisions.  No effusions or edema.  No evidence of synovitis.  Full range of motion of the fingers and wrist.  Normal  and grasp strength.  Tenderness noted over the TFCC region to palpation.  Slight discomfort with translation of the distal radial ulnar joint.  No pain throughout the remaining wrist otherwise.  Graham test  is negative.  No pain throughout the remaining forearm including the bony prominences.  Negative Tinel's and Phalen sign.  Radial pulses +2 and symmetric.  No thenar or hypothenar atrophy.  No mechanical clicking or snapping with circumduction maneuvers or loading of the wrist.            Personal Independent visualization of the below images done today:  3 views of the patient's right wrist are obtained and reviewed today.  No evidence of fracture, dislocation.  No evidence of high-grade arthritis.  Normal x-ray    Patient's conditions were thoroughly discussed during today's visit with total time reviewing patient's previous medical records/history/radiology, face-to-face examination and discussion and plan of care with the patient and documentation being 30 minutes for today's clinical visit  Javi Gimenez PA-C  [facility]    This note was completed in part using a voice recognition software, any grammatical or context distortion are unintentional and inherent to the software.       Again, thank you for allowing me to participate in the care of your patient.        Sincerely,        Javi Gimenez PA-C

## 2024-11-09 NOTE — PATIENT INSTRUCTIONS
Today we discussed the underlying etiology/pathology of patient's   1. Right wrist pain- TFCC area      Discussed diagnosis and treatment options with the patient today. A shared decision making model was used. The patient's values and choices were respected. The following represents what was discussed and decided upon by the provider and the patient.   -We discussed the patient sustained an injury to her right wrist in childhood with a fall and an outstretched hand mechanism.  Since then she has had intermittent reproduction of symptoms over the TFCC region/ulnar styloid process of the wrist.  Activities that seem to aggravated this condition was playing violin in her earlier years which she currently does not do.  Symptoms now have reappeared over the last 2 to 3 days with no known particular injury  - X-rays reviewed today showing no evidence of fracture, dislocation, arthritis or other abnormality  - We discussed that she has likely soft tissue irritation in the TFCC region  - We discussed the mainstays of treatment for this which include appropriate rest, immobilization if needed with a wrist brace, use of anti-inflammatory medicines to bring down inflammation and pain and appropriate occupational therapy rehab program for wrist strengthening and stabilization  - We discussed that if all conservative treatments fail that MRI of the wrist could be obtained to evaluate for structural abnormality to consider localized corticosteroid injection or surgical intervention  - Patient at this time will purchase over-the-counter Voltaren gel and use it topically.  She will apply it once every 6-8 hours making sure she does not wash it off.  She will continue with this until she becomes asymptomatic.  She already has a wrist brace that she can wear as needed.  - Patient will be referred to occupational therapy and will be contacted early next week to find mutual times to meet.  In the meantime she will check with her  insurance company to make sure that she has appropriate healthcare coverage for occupational therapy sessions  - If patient does not continue to get good symptomatic control or relief with above treatment plan she may follow-up for repeat assessment and consideration of ordering right wrist MRI    -Call direct clinic number [[phone]] at any time with questions or concerns in regards to your recent office visit with me.     Javi Gimenez PA-C  [facility]    This note was completed in part using a voice recognition software, any grammatical or context distortion are unintentional and inherent to the software.     OVER THE COUNTER MEDICATIONS  Insurance usually does not cover these. Please check your local drug store and/or [employer].    Topical lidocaine gel

## 2024-11-09 NOTE — PROGRESS NOTES
ASSESSMENT & PLAN     Today we discussed the underlying etiology/pathology of patient's   1. Right wrist pain- TFCC area      Discussed diagnosis and treatment options with the patient today. A shared decision making model was used. The patient's values and choices were respected. The following represents what was discussed and decided upon by the provider and the patient.   -We discussed the patient sustained an injury to her right wrist in childhood with a fall and an outstretched hand mechanism.  Since then she has had intermittent reproduction of symptoms over the TFCC region/ulnar styloid process of the wrist.  Activities that seem to aggravated this condition was playing violin in her earlier years which she currently does not do.  Symptoms now have reappeared over the last 2 to 3 days with no known particular injury  - X-rays reviewed today showing no evidence of fracture, dislocation, arthritis or other abnormality  - We discussed that she has likely soft tissue irritation in the TFCC region  - We discussed the mainstays of treatment for this which include appropriate rest, immobilization if needed with a wrist brace, use of anti-inflammatory medicines to bring down inflammation and pain and appropriate occupational therapy rehab program for wrist strengthening and stabilization  - We discussed that if all conservative treatments fail that MRI of the wrist could be obtained to evaluate for structural abnormality to consider localized corticosteroid injection or surgical intervention  - Patient at this time will purchase over-the-counter Voltaren gel and use it topically.  She will apply it once every 6-8 hours making sure she does not wash it off.  She will continue with this until she becomes asymptomatic.  She already has a wrist brace that she can wear as needed.  - Patient will be referred to occupational therapy and will be contacted early next week to find mutual times to meet.  In the meantime she  will check with her insurance company to make sure that she has appropriate healthcare coverage for occupational therapy sessions  - If patient does not continue to get good symptomatic control or relief with above treatment plan she may follow-up for repeat assessment and consideration of ordering right wrist MRI    -Call direct clinic number [[phone]] at any time with questions or concerns in regards to your recent office visit with me.     Javi Gimenez PA-C  [facility]    This note was completed in part using a voice recognition software, any grammatical or context distortion are unintentional and inherent to the software.     OVER THE COUNTER MEDICATIONS  Insurance usually does not cover these. Please check your local drug store and/or [email or web address].    Topical lidocaine gel                   SUBJECTIVE  Sandi High is a/an 34 year old female who is seen for acute on chronic intermittent right wrist pain.  No acute injury or trauma.  Patient states that in childhood she fell on her right wrist causing pain and discomfort.  Since then she has had intermittent symptoms always isolated over the distal ulnar styloid process/TFCC region with activities such as playing violin.  She has a wrist brace that she wears to help minimize symptoms as needed.  She has not tried any oral medications, topical medications rest or ice otherwise for her condition.  No previous history of surgery or treatment for this condition.  She denies any numbness or tingling of her digits.    Past Medical History:   Diagnosis Date    Suprapubic pain 06/10/2024    resolved once iud removed 4/2024       Social History     Socioeconomic History    Marital status: Single   Tobacco Use    Smoking status: Never    Smokeless tobacco: Never   Vaping Use    Vaping status: Never Used   Substance and Sexual Activity    Alcohol use: Never    Drug use: Never    Sexual activity: Yes     Partners: Male     Birth control/protection:  Condom   Other Topics Concern    Parent/sibling w/ CABG, MI or angioplasty before 65F 55M? No   Social History Narrative    6/2024: moved to [place] with boyfriend. Dissertation next year. Summer vacation. Travel to [place] and [place]         Mar 2024: living with roommate,  on weekend with boyfriend    PHD student at the  , on precrastination     Social Drivers of Health     Financial Resource Strain: Low Risk  (6/7/2024)    Financial Resource Strain     Within the past 12 months, have you or your family members you live with been unable to get utilities (heat, electricity) when it was really needed?: No   Food Insecurity: Low Risk  (6/7/2024)    Food Insecurity     Within the past 12 months, did you worry that your food would run out before you got money to buy more?: No     Within the past 12 months, did the food you bought just not last and you didn t have money to get more?: No   Transportation Needs: Low Risk  (6/7/2024)    Transportation Needs     Within the past 12 months, has lack of transportation kept you from medical appointments, getting your medicines, non-medical meetings or appointments, work, or from getting things that you need?: No   Physical Activity: Insufficiently Active (6/7/2024)    Exercise Vital Sign     Days of Exercise per Week: 1 day     Minutes of Exercise per Session: 20 min   Stress: Stress Concern Present (6/7/2024)    [employer] - Occupational Stress Questionnaire     Feeling of Stress : To some extent   Social Connections: Unknown (6/7/2024)    Social Connection and Isolation Panel [NHANES]     Frequency of Social Gatherings with Friends and Family: Once a week   Interpersonal Safety: Low Risk  (6/10/2024)    Interpersonal Safety     Do you feel physically and emotionally safe where you currently live?: Yes     Within the past 12 months, have you been hit, slapped, kicked or otherwise physically hurt by someone?: No     Within the past 12 months,  have you been humiliated or emotionally abused in other ways by your partner or ex-partner?: No   Housing Stability: High Risk (6/7/2024)    Housing Stability     Do you have housing? : No     Are you worried about losing your housing?: No         Patient's past medical, surgical, social, and family histories were personally reviewed today and no changes are noted.    REVIEW OF SYSTEMS:  10 point ROS is negative other than symptoms noted above in HPI, Past Medical History or as stated below  Constitutional: NEGATIVE for fever, chills, change in weight  Skin: NEGATIVE for worrisome rashes, moles or lesions  GI/: NEGATIVE for bowel or bladder changes  Neuro: NEGATIVE for weakness, dizziness or paresthesias    OBJECTIVE:  Vital signs as noted in EPIC for 11/9/2024  General: healthy, alert and in no distress  HEENT: no scleral icterus or conjunctival erythema  Skin: no suspicious lesions or rash. No jaundice.  CV: no pedal edema  Resp: normal respiratory effort without conversational dyspnea   Psych: normal mood and affect  Neuro: Normal light sensory exam of lower extremity      MSK:  Exam shows a pleasant 34-year-old female who ambulates full weightbearing without assistive device.  She is alert and oriented x 3.  Examination of the right hand and wrist shows no obvious bruising, swelling or ecchymosis.  No previous surgical incisions.  No effusions or edema.  No evidence of synovitis.  Full range of motion of the fingers and wrist.  Normal  and grasp strength.  Tenderness noted over the TFCC region to palpation.  Slight discomfort with translation of the distal radial ulnar joint.  No pain throughout the remaining wrist otherwise.  Graham test is negative.  No pain throughout the remaining forearm including the bony prominences.  Negative Tinel's and Phalen sign.  Radial pulses +2 and symmetric.  No thenar or hypothenar atrophy.  No mechanical clicking or snapping with circumduction maneuvers or loading of the  wrist.            Personal Independent visualization of the below images done today:  3 views of the patient's right wrist are obtained and reviewed today.  No evidence of fracture, dislocation.  No evidence of high-grade arthritis.  Normal x-ray    Patient's conditions were thoroughly discussed during today's visit with total time reviewing patient's previous medical records/history/radiology, face-to-face examination and discussion and plan of care with the patient and documentation being 30 minutes for today's clinical visit  Javi Gimenez PA-C  [facility]    This note was completed in part using a voice recognition software, any grammatical or context distortion are unintentional and inherent to the software.

## 2024-11-26 ENCOUNTER — ANCILLARY PROCEDURE (OUTPATIENT)
Dept: MRI IMAGING | Facility: CLINIC | Age: 34
End: 2024-11-26
Attending: PHYSICIAN ASSISTANT
Payer: COMMERCIAL

## 2024-11-26 DIAGNOSIS — K76.9 LIVER LESION: ICD-10-CM

## 2024-11-26 PROCEDURE — A9585 GADOBUTROL INJECTION: HCPCS | Performed by: RADIOLOGY

## 2024-11-26 PROCEDURE — 74183 MRI ABD W/O CNTR FLWD CNTR: CPT | Performed by: RADIOLOGY

## 2024-11-26 RX ORDER — GADOBUTROL 604.72 MG/ML
7.5 INJECTION INTRAVENOUS ONCE
Status: COMPLETED | OUTPATIENT
Start: 2024-11-26 | End: 2024-11-26

## 2024-11-26 RX ADMIN — GADOBUTROL 4.5 ML: 604.72 INJECTION INTRAVENOUS at 14:45

## 2024-12-04 ENCOUNTER — THERAPY VISIT (OUTPATIENT)
Dept: OCCUPATIONAL THERAPY | Facility: REHABILITATION | Age: 34
End: 2024-12-04
Payer: COMMERCIAL

## 2024-12-04 DIAGNOSIS — M25.531 RIGHT WRIST PAIN: ICD-10-CM

## 2024-12-04 PROCEDURE — 97535 SELF CARE MNGMENT TRAINING: CPT | Mod: GO

## 2024-12-04 PROCEDURE — 97165 OT EVAL LOW COMPLEX 30 MIN: CPT | Mod: GO

## 2024-12-04 PROCEDURE — 97110 THERAPEUTIC EXERCISES: CPT | Mod: GO

## 2024-12-04 NOTE — PROGRESS NOTES
OCCUPATIONAL THERAPY EVALUATION  Type of Visit: Evaluation              Subjective        Presenting condition or subjective complaint: (Patient-Rptd) pain over TFCC area of distal ulna  Date of onset:      Relevant medical history: (Patient-Rptd) Concussions   Dates & types of surgery: (Patient-Rptd) Na    Prior diagnostic imaging/testing results: (Patient-Rptd) X-ray     Prior therapy history for the same diagnosis, illness or injury: (Patient-Rptd) No      Prior Level of Function  Indep in all areas     Living Environment  Social support: (Patient-Rptd) With a significant other or spouse   Type of home: (Patient-Rptd) [facility]   Stairs to enter the home: (Patient-Rptd) No       Ramp: (Patient-Rptd) No   Stairs inside the home: (Patient-Rptd) Yes (Patient-Rptd) 30 Is there a railing: (Patient-Rptd) Yes     Help at home: (Patient-Rptd) Self Cares (home health aide/personal care attendant, family, etc); Home management tasks (cooking, cleaning)  Equipment owned:       Employment: (Patient-Rptd) Yes (Patient-Rptd)  -   Hobbies/Interests: (Patient-Rptd) photography, walking, reading    Patient goals for therapy: (Patient-Rptd) rotate my wrist without pain    Pain assessment: Pain present  Stiffness and pain without brace   Difficulty during housework to avoid motion        Objective     Upper Extremity Functional Index Score:  SCORE:   Column Totals: /80: (Patient-Rptd) 66   (A lower score indicates greater disability.)    Right hand dominant  Patient reports symptoms of pain, stiffness/loss of motion, and weakness/loss of strength    Pain Level (Scale 0-10)   12/4/2024   At Rest 0   With Use 8     Pain Description  Date 12/4/2024   Location wrist - ulnar aspect   Pain Quality Sharp   Frequency intermittent     Pain is worst  daytime   Exacerbated by  Certain motions   Relieved by rest and bracing    Progression improving     Edema  Edema (Circumference measured in cm)   12/4/2024 12/4/2024    L R    DWC 13.8 14.0       Sensation   WNL throughout all nerve distributions; per patient report    ROM     Wrist and digit AROM are WNL. Below is pain in wrist motion.     Pain Report: - none  + mild    ++ moderate    +++ severe   Wrist 12/4/2024 12/4/2024   AROM (PROM) L R   Extension  -   Flexion  -   RD  -   UD  -   Supination  +   Pronation  -       Tenderness: Pain level report on scale 0-10/10  WRIST PALPATION:  Date 12/4/2024   Side R   Ulna Styloid 0   TFCC 0   Distal Radius    Radial Styloid    DRUJ 0   Volar Scaphoid    Dorsal Scaphoid    Volar Lunate    Dorsal Lunate          Ulnar Dorsal Zone: Pain Report: - none  + mild    ++ moderate    +++ severe    12/4/2024   SIDE R   Radiocarpal P/S (TFCC--stab f/a, rotate with some compression) -   Ballottement II P/S (TFCC--stab hand/wrist, pt p/s) ++   TFCC load Test (UD, axially load wrist c volar/dorsal mvmt) +   UMTDG test (TFCC--approximate the pisotriquetral and ulna)    Lunotriquetral Sheer Test    Piano Key Sign (DRUJ)    Piano Key Test (DRUJ--distal ulna moved in pro/sup) -                 Strength   (Measured in pounds)  Pain Report: - none  + mild    ++ moderate    +++ severe    12/4/2024 12/4/2024   Trials L R   1  2  3 35 45+   Average         3 Pt Pinch 12/4/2024 12/4/2024   Trials L R   1  2  3 11 10+   Average           Assessment & Plan   CLINICAL IMPRESSIONS  Medical Diagnosis:      Treatment Diagnosis:      Impression/Assessment: Pt is a 34 year old female presenting to Occupational Therapy due to R wrist pain.  The following significant findings have been identified: Impaired activity tolerance, Impaired strength, and Pain.  These identified deficits interfere with their ability to perform self care tasks, work tasks, recreational activities, household chores, and meal planning and preparation as compared to previous level of function.   Patient's limitations or Problem List includes: Pain, Decreased , and Decreased pinch of the right  wrist which interferes with the patient's ability to perform Self Care Tasks (bathing), Work Tasks, Sleep Patterns, Recreational Activities, and Household Chores as compared to previous level of function.    Clinical Decision Making (Complexity):  Assessment of Occupational Performance: 5 or more Performance Deficits  Occupational Performance Limitations: bathing/showering, home establishment and management, meal preparation and cleanup, sleep, work, and leisure activities  Clinical Decision Making (Complexity): Low complexity    PLAN OF CARE  Treatment Interventions:  Modalities:  US and Paraffin  Therapeutic Exercise:  AROM, Tendon Gliding, Isometrics, and Stabilization  Neuromuscular re-education:  Proprioceptive Training  Manual Techniques:  Myofascial release and Manual edema mobilization  Orthotic Fabrication:  Static  Self Care:  Self Care Tasks, Ergonomic Considerations, and Work Tasks    Long Term Goals          Frequency of Treatment:    Duration of Treatment:       Recommended Referrals to Other Professionals:  None  Education Assessment:       Risks and benefits of evaluation/treatment have been explained.   Patient/Family/caregiver agrees with Plan of Care.     Evaluation Time:            Signing Clinician: JESSICA Ronquillo

## 2024-12-23 ENCOUNTER — THERAPY VISIT (OUTPATIENT)
Dept: OCCUPATIONAL THERAPY | Facility: REHABILITATION | Age: 34
End: 2024-12-23
Payer: COMMERCIAL

## 2024-12-23 DIAGNOSIS — M25.531 RIGHT WRIST PAIN: Primary | ICD-10-CM

## 2024-12-23 PROCEDURE — 97110 THERAPEUTIC EXERCISES: CPT | Mod: GO | Performed by: OCCUPATIONAL THERAPIST

## 2025-01-23 ENCOUNTER — OFFICE VISIT (OUTPATIENT)
Dept: FAMILY MEDICINE | Facility: CLINIC | Age: 35
End: 2025-01-23
Payer: MEDICAID

## 2025-01-23 VITALS
RESPIRATION RATE: 20 BRPM | SYSTOLIC BLOOD PRESSURE: 108 MMHG | BODY MASS INDEX: 20.03 KG/M2 | WEIGHT: 102 LBS | DIASTOLIC BLOOD PRESSURE: 70 MMHG | OXYGEN SATURATION: 98 % | TEMPERATURE: 98.8 F | HEART RATE: 65 BPM | HEIGHT: 60 IN

## 2025-01-23 DIAGNOSIS — R10.31 RLQ ABDOMINAL PAIN: ICD-10-CM

## 2025-01-23 DIAGNOSIS — L20.9 ATOPIC DERMATITIS, UNSPECIFIED TYPE: Primary | ICD-10-CM

## 2025-01-23 RX ORDER — TRIAMCINOLONE ACETONIDE 1 MG/G
OINTMENT TOPICAL
Qty: 30 G | Refills: 0 | Status: SHIPPED | OUTPATIENT
Start: 2025-01-23

## 2025-01-23 ASSESSMENT — PAIN SCALES - GENERAL: PAINLEVEL_OUTOF10: NO PAIN (0)

## 2025-01-23 NOTE — PROGRESS NOTES
Assessment and Plan   34-year-old female with noncontributory past medical history who presents with a reddened xerotic rash growing over the last 2 months over her neck, forearm, and her thigh.  This is most consistent with atopic dermatitis on exam today.  I recommended a trial of triamcinolone as below.  She also brought up concern of right lower quadrant abdominal pain.  Really lack of other associated symptoms.  She is tender on exam though only mildly.  Shared decision making decided to pursue watchful waiting and if symptoms persist or worsen will workup with labs and imaging.    1. Atopic dermatitis, unspecified type (Primary)  - triamcinolone (KENALOG) 0.1 % external ointment; Apply sparingly to affected area two times daily  Dispense: 30 g; Refill: 0    2. RLQ abdominal pain    Follow up: PRN    The longitudinal plan of care for the diagnosis(es)/condition(s) as documented were addressed during this visit. Due to the added complexity in care, I will continue to support Sandi in the subsequent management and with ongoing continuity of care.    Dr. Socrates Merchant         HPI:   Sandi High is a 34 year old  female who presents for:    Chief Complaint   Patient presents with    Rash     Itchy rash on arm, neck, leg      Patient tells me that over the last 2 months she has had pruritic reddened dry appearing rash over her right neck, left forearm, right inner thigh.  She has been using Vaseline lotion as well as some aloe lotion as needed for this which helped some but has not resolved the issue.  Never had symptoms like this before.  She thought it could be dryness and got a humidifier for her room but again this did not resolve the issue.    She also brings up concern of right lower quadrant abdominal pain.  Has been coming and going intermittently and will last a day or 2 to a couple of hours mostly in the middle of the night.  Really lack of other symptoms associated with.  No nausea, constipation, diarrhea,  hematochezia, melena, urinary symptoms.  She denies any issues with her.  Seems to be regular.  Denies any correlation of the pain with her period.         PMHX:     Patient Active Problem List   Diagnosis    Epigastric pain    Heart burn    Liver mass, left lobe    Dysgeusia    Paresthesias    Right wrist pain- TFCC area       Social History     Tobacco Use    Smoking status: Never    Smokeless tobacco: Never   Vaping Use    Vaping status: Never Used   Substance Use Topics    Alcohol use: Never    Drug use: Never       Social History     Social History Narrative    6/2024: moved to [place] with boyfriend. Dissertation next year. Summer vacation. Travel to [place] and [place]         Mar 2024: living with roommate,  on weekend with boyfriend    PHD student at the  , on precrastination       Allergies   Allergen Reactions    Dust Mites Itching    Pollen Extract        No results found for this or any previous visit (from the past 24 hours).         Review of Systems:    ROS: 10 point ROS neg other than the symptoms noted above in the HPI.         Physical Exam:     Vitals:    01/23/25 1445   BP: 108/70   Pulse: 65   Resp: 20   Temp: 98.8  F (37.1  C)   SpO2: 98%   Weight: 46.3 kg (102 lb)   Height: 1.524 m (5')     Body mass index is 19.92 kg/m .    General appearance: Alert, cooperative, no distress, appears stated age  Head: Normocephalic, atraumatic, without obvious abnormality  Eyes: Pupils equal round, reactive.  Conjunctiva clear.  Nose: Nares normal, no drainage.  Throat: Lips, mucosa, tongue normal mucosa pink and moist  Neck: Supple, symmetric, trachea midline,  Lungs: Clear to auscultation bilaterally, no wheezing or crackles present.  Respirations unlabored  Heart: Regular rate and rhythm, normal S1 and S2, no murmur, rub or gallop.  Abdomen: Soft, nontender, tender in RLQ.  No masses or organomegaly.  Skin: Reddened xerotic rash over her right anterior neck, left medial forearm.

## 2025-03-13 ENCOUNTER — OFFICE VISIT (OUTPATIENT)
Dept: INTERNAL MEDICINE | Facility: CLINIC | Age: 35
End: 2025-03-13
Payer: COMMERCIAL

## 2025-03-13 VITALS
HEART RATE: 76 BPM | RESPIRATION RATE: 16 BRPM | OXYGEN SATURATION: 97 % | DIASTOLIC BLOOD PRESSURE: 64 MMHG | TEMPERATURE: 97.3 F | WEIGHT: 104.2 LBS | BODY MASS INDEX: 20.46 KG/M2 | SYSTOLIC BLOOD PRESSURE: 100 MMHG | HEIGHT: 60 IN

## 2025-03-13 DIAGNOSIS — L29.9 EAR ITCH: Primary | ICD-10-CM

## 2025-03-13 DIAGNOSIS — N89.8 VAGINAL ITCHING: ICD-10-CM

## 2025-03-13 LAB
CLUE CELLS: NORMAL
TRICHOMONAS, WET PREP: NORMAL
WBC'S/HIGH POWER FIELD, WET PREP: NORMAL
YEAST, WET PREP: NORMAL

## 2025-03-13 NOTE — PATIENT INSTRUCTIONS
For ear itching you can use hydrocortisone OTC.     If wet prep positive for fungal infection, you can use Monistat OTC.

## 2025-03-13 NOTE — PROGRESS NOTES
Assessment & Plan     Ear itch  Patient has both ear itching on and off for about 2 weeks, with no ear pain, drainage.  On the exam, both ear canals are clean, no skin changes, TM normal.  She will use hydrocortisone otc prn. She will change shampoo and conditioner.    Vaginal itching  For few weeks. She has h/o vaginal candida.  Wet prep done today. Result pending.  - Wet prep - Clinic Collect      The longitudinal plan of care for the diagnosis(es)/condition(s) as documented were addressed during this visit. Due to the added complexity in care, I will continue to support Sandi in the subsequent management and with ongoing continuity of care.          Subjective   Sandi is a 34 year old, presenting for the following health issues:  Ear Problem (Itchy Ear x A Few Week's Both Ears L Is More Bothersome-Also Vaginal Itching Before Starting Period And Still A Little After -No Discharge Or Foul Odor )      3/13/2025    12:49 PM   Additional Questions   Roomed by Melissa     Ear Problem    History of Present Illness       Reason for visit:  Ear and viginal itchiness  Symptom onset:  1-2 weeks ago   She is taking medications regularly.                      Objective    /64   Pulse 76   Temp 97.3  F (36.3  C) (Temporal)   Resp 16   Ht 1.524 m (5')   Wt 47.3 kg (104 lb 3.2 oz)   LMP 03/12/2025 (Exact Date)   SpO2 97%   BMI 20.35 kg/m    Body mass index is 20.35 kg/m .  Physical Exam               Signed Electronically by: Ryder Roger MD

## 2025-04-16 ENCOUNTER — THERAPY VISIT (OUTPATIENT)
Dept: SPEECH THERAPY | Facility: CLINIC | Age: 35
End: 2025-04-16
Attending: FAMILY MEDICINE
Payer: COMMERCIAL

## 2025-04-16 DIAGNOSIS — F80.0 SPEECH ARTICULATION DISORDER: ICD-10-CM

## 2025-04-16 PROCEDURE — 92522 EVALUATE SPEECH PRODUCTION: CPT | Mod: GN

## 2025-04-16 NOTE — PROGRESS NOTES
SPEECH LANGUAGE PATHOLOGY EVALUATION       Fall Risk Screen:  Have you fallen 2 or more times in the past year?: No  Have you fallen and had an injury in the past year?: No    Subjective   Presenting condition or subjective complaint: I have difficulty speaking clearly sometimes  Date of onset:   4/3/2025 (date of orders)  Relevant medical history:     Dates & types of surgery: wisdom tooth removal at 01/27/2022    Prior diagnostic imaging/testing results:       Prior therapy history for the same diagnosis, illness or injury: No      Living Environment  Social support: With a significant other or spouse   Help at home: Home management tasks (cooking, cleaning)  Equipment owned:       Employment: No    Hobbies/Interests: hiking, photography, reading, coding    Patient goals for therapy: speak and enunciate clearly    Pain assessment: Pain denied     Objective     ORAL MOTOR FUNCTION:  generally intact  Dentition: natural dentition  Mandibular function: intact  Oral labial function: WFL  Lingual function: WFL  Laryngeal function: voicing WFL      MOTOR SPEECH:  Speech Intelligibility:     Word level speech intelligibility: intact, 100% intelligible      Phrase/sentence level speech intelligibility: intact, 100% intelligible      Conversation level speech intelligibility: minimal impairment, 85% intelligible   Respiration Observations: WNL   Phonation: WNL   Maximum phonation time (seconds): 9.8  Pitch glide: able   Resonance: WNL  Rate/prosody: WNL   Articulation: imprecise articulation, linguistic differences d/t English as a second language    Repetition Skills: sounds intact  words: intact   phrases: intact  Speech Fluency: minimal impairment, difficulty initiating articulation, restarts/self-corrections   Motor speech level of impairment: mild impairment     Assessment & Plan   CLINICAL IMPRESSIONS   Medical Diagnosis:    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 The following significant findings have been identified: dysfluencies and impaired speech intelligibility, characterized by imprecise articulation and conversational level dysfluencies. Identified deficits interfere with their ability to communicate wants and needs and communicate within the home or community as compared to previous level of function.    PLAN OF CARE  Treatment Interventions: Speech    Prognosis to achieve stated therapy goals is good   Rehab potential is impacted by: patient motivation    Long Term Goals:   Goal Identifier: Fluency  Goal Description: Patient will utilize fluency enhancing strategies (e.g., easy onset, light articulatory contacts, slow rate, etc.) with minimal to no assistance to promote increased fluency to % in a variety of verbal communication tasks (including telephone, complex description, etc.)  Rationale: To maximize the ability to communicate wants and needs within the home or community;   Target Date: 7/14/2025     Goal Identifier: Motor Speech Strategies  Goal Description: Patient will verbalize and utilize motor speech strategies with 90% accuracy and no cues at the multi-syllabic words, sentence, and conversation levels to promote increased articulatory accuracy for functional communication.   Rationale: To maximize the ability to communicate wants and needs within the home or community;   Target Date: 7/14/2025     Frequency of Treatment:  1x per week  Duration of Treatment:   up to 90 days    Recommended Referrals to Other Professionals:  none  Education Assessment: Patient;Listening;No Barriers to Learning;    Results of assessment, recommended POC, role of SLP in OP     Risks and benefits of evaluation/treatment have been explained.   Patient/Family/caregiver agrees with Plan of Care.     Evaluation Time: Sounds Production (Artic, Phonology, Apraxia, Dysarthria); 40 minutes         Present: Not applicable     Signing Clinician: NISHI Fuentes      [facility]                                                                                   OUTPATIENT SPEECH LANGUAGE PATHOLOGY    PLAN OF TREATMENT FOR OUTPATIENT REHABILITATION   Patient's Last Name, First Name, Sandi Avila    YOB: 1990   Provider's Name   [facility]   Medical Record No.  [id number]     Onset Date:  4/3/2025 (date of orders) Start of Care Date:  4/16/2025      Medical Diagnosis:   Speech articulation disorder [F80.0]     SLP Treatment Diagnosis:   fluency and articulation disorder  Plan of Treatment  Frequency/Duration:  1x per week  /   up to 90 days    Certification date from  4/16/2025    To     7/14/2025        See note for plan of treatment details and functional goals     NISHI Fuentes                         I CERTIFY THE NEED FOR THESE SERVICES FURNISHED UNDER        THIS PLAN OF TREATMENT AND WHILE UNDER MY CARE     (Physician attestation of this document indicates review and certification of the therapy plan).              Referring Provider:  Kae Peck    Initial Assessment  See Epic Evaluation-

## 2025-04-17 ENCOUNTER — OFFICE VISIT (OUTPATIENT)
Dept: ORTHOPEDICS | Facility: CLINIC | Age: 35
End: 2025-04-17
Payer: COMMERCIAL

## 2025-04-17 VITALS — BODY MASS INDEX: 18.95 KG/M2 | HEIGHT: 62 IN | WEIGHT: 103 LBS

## 2025-04-17 DIAGNOSIS — M25.531 RIGHT WRIST PAIN: Primary | ICD-10-CM

## 2025-04-17 RX ORDER — MELOXICAM 7.5 MG/1
7.5 TABLET ORAL DAILY
Qty: 10 TABLET | Refills: 0 | Status: SHIPPED | OUTPATIENT
Start: 2025-04-17

## 2025-04-17 NOTE — PROGRESS NOTES
ASSESSMENT & PLAN       Today we discussed the underlying etiology/pathology of patient's   1. Right wrist pain- TFCC area        Today a shared decision making model was used. The patient's values and choices were respected. The following information represents what was discussed and decided upon by the provider and the patient.  - We discussed the patient sustained an acute right wrist injury yesterday lifting a bottle of water and placing it in her car causing pain over the distal ulnar styloid process region.  Patient has utilized topical Voltaren as well as reinitiated using her Velcro wrist brace.  She denies numbness or tingling.  - We discussed that this is the same region that she had previously injured and had pain back in November 2024 which was treated conservatively with 2 sessions of occupational therapy, over-the-counter wrist bracing and topical Voltaren with good response and recovery.  - Due to acute on chronic wrist pain we agreed to proceed with right wrist MRI to evaluate the TFCC region for possible soft tissue injury/ligament tear.  I will contact the patient via telephone once results of MRI are known to discuss further treatment.  - Patient in the meantime we will continue with her Velcro wrist brace for protection and wearing it at all times except for skin care  - Patient will be started on meloxicam 7.5 mg tablet to be taken 1 tablet daily after a meal to help decrease inflammation and pain  - Patient will continue with topical Voltaren gel to her wrist to help with inflammation and pain as well as should utilize topical ice    - Appointment line phone number is [[phone]]     Javi Gimenez PA-C  [facility]    This note was completed in part using a voice recognition software, any grammatical or context distortion are unintentional and inherent to the software.         SUBJECTIVE  Sandi High is a/an 34 year old female who is seen as a self referral for  evaluation of acute right wrist pain. The patient is seen by themselves.  She is wearing a right wrist brace into clinic today.    Onset: 04/18/2025. Patient describes injury as she was carrying a 5 gallon water bottle and when she was lifting it into the car, she felt a pop in her right wrist.  Location of Pain: right wrist, distal ulnar area  Rating of Pain at worst: 10/10  Rating of Pain Currently: 0/10  Worsened by: twisting wrist  Better with: no movement  Treatments tried: rest/activity avoidance, ice, and other medications: voltaren 1% gel  Quality: sharp  Associated symptoms: swelling  Orthopedic history related to this area/condition: Yes, TFCC, right wrist  Relevant surgical history for this area: NO  Social history: Unemployed, right handed.  Loves to take phots.    Past Medical History:   Diagnosis Date    Suprapubic pain 06/10/2024    resolved once iud removed 4/2024       Social History     Socioeconomic History    Marital status: Single   Tobacco Use    Smoking status: Never    Smokeless tobacco: Never   Vaping Use    Vaping status: Never Used   Substance and Sexual Activity    Alcohol use: Never    Drug use: Never    Sexual activity: Yes     Partners: Male     Birth control/protection: Condom   Other Topics Concern    Parent/sibling w/ CABG, MI or angioplasty before 65F 55M? No   Social History Narrative    6/2024: moved to [place] with boyfriend. Dissertation next year. Summer vacation. Travel to [place] and [place]         Mar 2024: living with roommate,  on weekend with boyfriend    PHD student at the  , on precrastination     Social Drivers of Health     Financial Resource Strain: Low Risk  (6/7/2024)    Financial Resource Strain     Within the past 12 months, have you or your family members you live with been unable to get utilities (heat, electricity) when it was really needed?: No   Food Insecurity: Low Risk  (6/7/2024)    Food Insecurity     Within the past 12 months, did you worry that your  food would run out before you got money to buy more?: No     Within the past 12 months, did the food you bought just not last and you didn t have money to get more?: No   Transportation Needs: Low Risk  (6/7/2024)    Transportation Needs     Within the past 12 months, has lack of transportation kept you from medical appointments, getting your medicines, non-medical meetings or appointments, work, or from getting things that you need?: No   Physical Activity: Insufficiently Active (6/7/2024)    Exercise Vital Sign     Days of Exercise per Week: 1 day     Minutes of Exercise per Session: 20 min   Stress: Stress Concern Present (6/7/2024)    [employer] [place] [employer] - Occupational Stress Questionnaire     Feeling of Stress : To some extent   Social Connections: Unknown (6/7/2024)    Social Connection and Isolation Panel [NHANES]     Frequency of Social Gatherings with Friends and Family: Once a week   Interpersonal Safety: Low Risk  (1/23/2025)    Interpersonal Safety     Do you feel physically and emotionally safe where you currently live?: Yes     Within the past 12 months, have you been hit, slapped, kicked or otherwise physically hurt by someone?: No     Within the past 12 months, have you been humiliated or emotionally abused in other ways by your partner or ex-partner?: No   Housing Stability: High Risk (6/7/2024)    Housing Stability     Do you have housing? : No     Are you worried about losing your housing?: No         Patient's past medical, surgical, social, and family histories were personally reviewed today and no changes are noted.    REVIEW OF SYSTEMS:  10 point ROS is negative other than symptoms noted above in HPI, Past Medical History or as stated below  Constitutional: NEGATIVE for fever, chills, change in weight  Skin: NEGATIVE for worrisome rashes, moles or lesions  GI/: NEGATIVE for bowel or bladder changes  Neuro: NEGATIVE for weakness, dizziness or  paresthesias    OBJECTIVE:  Vital signs as noted in EPIC for 4/17/2025  General: healthy, alert and in no distress  HEENT: no scleral icterus or conjunctival erythema  Skin: no suspicious lesions or rash. No jaundice.  CV: no pedal edema  Resp: normal respiratory effort without conversational dyspnea   Psych: normal mood and affect  Neuro: Normal light sensory exam of lower extremity      MSK:  Exam shows a pleasant 34-year-old female who ambulates full weightbearing without assistive device.  Patient presents her own history.  Patient wearing a Velcro wrist brace on the right side which is removed.  Examination of the right upper extremity shows no obvious bruising, swelling or ecchymosis.  Patient is nontender at the level of the elbow including medial and lateral condyle, anterior cubital fossa and distal bicep tendon.  No pain over the olecranon.  Patient is nontender through the entire length of the radius including no pain over the radiocarpal joint, scaphoid, first CMC joint or MCP joint.  She has normal range of motion of the thumb and has a negative Finklestein's test.  No tenderness over the distal radial ulnar joint.  Patient is nontender throughout the bony prominences of the ulna itself including ulnar styloid process but shows tenderness over the TFCC more so dorsally and ulnarly.  No pain on the volar aspect of the wrist.  Patient has symmetric wrist extension without significant discomfort.  Wrist flexion is limited causing pain over the distal radial ulnar TFCC region of the wrist.  She has pain with ulnar deviation.  Patient shows adequate strength of all flexor and extensor tendons but has reproduced ulnar styloid process pain with resisted thumb extension, index finger and long finger extension.  Decreased pain with the ring finger and fifth digit extension.  Patient shows adequate opposition strength for pinching and grasping.  +2 radial pulses.  Patient is neurovascularly  intact          RADIOLOGY AND LABORATORY:   Personal Independent visualization of the below images done today:  Previous x-rays of the patient's right wrist are again reviewed today from November 2024 showing no abnormality.  I agree with interpretation below.        Study Result    Narrative & Impression   EXAM: XR WRIST RIGHT G/E 3 VIEWS  LOCATION: [facility]  DATE: 11/9/2024     INDICATION: pain over TFCC area of distal ulna without trauma  COMPARISON: None.                                                                      IMPRESSION: Normal joint spaces and alignment. No fracture.       Patient's conditions were thoroughly discussed during today's clinical visit with total time reviewing patient's previous medical records/history/radiology, face-to-face examination and discussion and plan of care with the patient and documentation being 30 minutes on today's clinical visit  Javi Gimenez PA-C  [facility]    This note was completed in part using a voice recognition software, any grammatical or context distortion are unintentional and inherent to the software.

## 2025-04-17 NOTE — LETTER
4/17/2025      Sandi High  [address]  [facility] [address]      Dear Colleague,    Thank you for referring your patient, aSndi High, to the [facility] [place]. Please see a copy of my visit note below.    ASSESSMENT & PLAN       Today we discussed the underlying etiology/pathology of patient's   1. Right wrist pain- TFCC area        Today a shared decision making model was used. The patient's values and choices were respected. The following information represents what was discussed and decided upon by the provider and the patient.  - We discussed the patient sustained an acute right wrist injury yesterday lifting a bottle of water and placing it in her car causing pain over the distal ulnar styloid process region.  Patient has utilized topical Voltaren as well as reinitiated using her Velcro wrist brace.  She denies numbness or tingling.  - We discussed that this is the same region that she had previously injured and had pain back in November 2024 which was treated conservatively with 2 sessions of occupational therapy, over-the-counter wrist bracing and topical Voltaren with good response and recovery.  - Due to acute on chronic wrist pain we agreed to proceed with right wrist MRI to evaluate the TFCC region for possible soft tissue injury/ligament tear.  I will contact the patient via telephone once results of MRI are known to discuss further treatment.  - Patient in the meantime we will continue with her Velcro wrist brace for protection and wearing it at all times except for skin care  - Patient will be started on meloxicam 7.5 mg tablet to be taken 1 tablet daily after a meal to help decrease inflammation and pain  - Patient will continue with topical Voltaren gel to her wrist to help with inflammation and pain as well as should utilize topical ice    - Appointment line phone number is [[phone]]     Javi Gimenez PA-C  [facility]    This  note was completed in part using a voice recognition software, any grammatical or context distortion are unintentional and inherent to the software.         SUBJECTIVE  Sandi High is a/an 34 year old female who is seen as a self referral for evaluation of acute right wrist pain. The patient is seen by themselves.  She is wearing a right wrist brace into clinic today.    Onset: 04/18/2025. Patient describes injury as she was carrying a 5 gallon water bottle and when she was lifting it into the car, she felt a pop in her right wrist.  Location of Pain: right wrist, distal ulnar area  Rating of Pain at worst: 10/10  Rating of Pain Currently: 0/10  Worsened by: twisting wrist  Better with: no movement  Treatments tried: rest/activity avoidance, ice, and other medications: voltaren 1% gel  Quality: sharp  Associated symptoms: swelling  Orthopedic history related to this area/condition: Yes, TFCC, right wrist  Relevant surgical history for this area: NO  Social history: Unemployed, right handed.  Loves to take phots.    Past Medical History:   Diagnosis Date     Suprapubic pain 06/10/2024    resolved once iud removed 4/2024       Social History     Socioeconomic History     Marital status: Single   Tobacco Use     Smoking status: Never     Smokeless tobacco: Never   Vaping Use     Vaping status: Never Used   Substance and Sexual Activity     Alcohol use: Never     Drug use: Never     Sexual activity: Yes     Partners: Male     Birth control/protection: Condom   Other Topics Concern     Parent/sibling w/ CABG, MI or angioplasty before 65F 55M? No   Social History Narrative    6/2024: moved to [place] with boyfriend. Dissertation next year. Summer vacation. Travel to [place] and [place]         Mar 2024: living with roommate,  on weekend with boyfriend    PHD student at the  , on precrastination     Social Drivers of Health     Financial Resource Strain: Low Risk  (6/7/2024)    Financial Resource Strain      Within the  past 12 months, have you or your family members you live with been unable to get utilities (heat, electricity) when it was really needed?: No   Food Insecurity: Low Risk  (6/7/2024)    Food Insecurity      Within the past 12 months, did you worry that your food would run out before you got money to buy more?: No      Within the past 12 months, did the food you bought just not last and you didn t have money to get more?: No   Transportation Needs: Low Risk  (6/7/2024)    Transportation Needs      Within the past 12 months, has lack of transportation kept you from medical appointments, getting your medicines, non-medical meetings or appointments, work, or from getting things that you need?: No   Physical Activity: Insufficiently Active (6/7/2024)    Exercise Vital Sign      Days of Exercise per Week: 1 day      Minutes of Exercise per Session: 20 min   Stress: Stress Concern Present (6/7/2024)    [employer] [employer] - Occupational Stress Questionnaire      Feeling of Stress : To some extent   Social Connections: Unknown (6/7/2024)    Social Connection and Isolation Panel [NHANES]      Frequency of Social Gatherings with Friends and Family: Once a week   Interpersonal Safety: Low Risk  (1/23/2025)    Interpersonal Safety      Do you feel physically and emotionally safe where you currently live?: Yes      Within the past 12 months, have you been hit, slapped, kicked or otherwise physically hurt by someone?: No      Within the past 12 months, have you been humiliated or emotionally abused in other ways by your partner or ex-partner?: No   Housing Stability: High Risk (6/7/2024)    Housing Stability      Do you have housing? : No      Are you worried about losing your housing?: No         Patient's past medical, surgical, social, and family histories were personally reviewed today and no changes are noted.    REVIEW OF SYSTEMS:  10 point ROS is negative other than symptoms noted above in HPI, Past  Medical History or as stated below  Constitutional: NEGATIVE for fever, chills, change in weight  Skin: NEGATIVE for worrisome rashes, moles or lesions  GI/: NEGATIVE for bowel or bladder changes  Neuro: NEGATIVE for weakness, dizziness or paresthesias    OBJECTIVE:  Vital signs as noted in EPIC for 4/17/2025  General: healthy, alert and in no distress  HEENT: no scleral icterus or conjunctival erythema  Skin: no suspicious lesions or rash. No jaundice.  CV: no pedal edema  Resp: normal respiratory effort without conversational dyspnea   Psych: normal mood and affect  Neuro: Normal light sensory exam of lower extremity      MSK:  Exam shows a pleasant 34-year-old female who ambulates full weightbearing without assistive device.  Patient presents her own history.  Patient wearing a Velcro wrist brace on the right side which is removed.  Examination of the right upper extremity shows no obvious bruising, swelling or ecchymosis.  Patient is nontender at the level of the elbow including medial and lateral condyle, anterior cubital fossa and distal bicep tendon.  No pain over the olecranon.  Patient is nontender through the entire length of the radius including no pain over the radiocarpal joint, scaphoid, first CMC joint or MCP joint.  She has normal range of motion of the thumb and has a negative Finklestein's test.  No tenderness over the distal radial ulnar joint.  Patient is nontender throughout the bony prominences of the ulna itself including ulnar styloid process but shows tenderness over the TFCC more so dorsally and ulnarly.  No pain on the volar aspect of the wrist.  Patient has symmetric wrist extension without significant discomfort.  Wrist flexion is limited causing pain over the distal radial ulnar TFCC region of the wrist.  She has pain with ulnar deviation.  Patient shows adequate strength of all flexor and extensor tendons but has reproduced ulnar styloid process pain with resisted thumb extension,  index finger and long finger extension.  Decreased pain with the ring finger and fifth digit extension.  Patient shows adequate opposition strength for pinching and grasping.  +2 radial pulses.  Patient is neurovascularly intact          RADIOLOGY AND LABORATORY:   Personal Independent visualization of the below images done today:  Previous x-rays of the patient's right wrist are again reviewed today from November 2024 showing no abnormality.  I agree with interpretation below.        Study Result    Narrative & Impression   EXAM: XR WRIST RIGHT G/E 3 VIEWS  LOCATION: [facility]  DATE: 11/9/2024     INDICATION: pain over TFCC area of distal ulna without trauma  COMPARISON: None.                                                                      IMPRESSION: Normal joint spaces and alignment. No fracture.       Patient's conditions were thoroughly discussed during today's clinical visit with total time reviewing patient's previous medical records/history/radiology, face-to-face examination and discussion and plan of care with the patient and documentation being 30 minutes on today's clinical visit  Javi Gimenez PA-C  [facility]    This note was completed in part using a voice recognition software, any grammatical or context distortion are unintentional and inherent to the software.       Again, thank you for allowing me to participate in the care of your patient.        Sincerely,        Javi Gimenez PA-C    Electronically signed

## 2025-04-23 ENCOUNTER — THERAPY VISIT (OUTPATIENT)
Dept: SPEECH THERAPY | Facility: CLINIC | Age: 35
End: 2025-04-23
Attending: FAMILY MEDICINE
Payer: COMMERCIAL

## 2025-04-23 DIAGNOSIS — F80.0 SPEECH ARTICULATION DISORDER: Primary | ICD-10-CM

## 2025-04-23 PROCEDURE — 92507 TX SP LANG VOICE COMM INDIV: CPT | Mod: GN

## 2025-04-30 ENCOUNTER — HOSPITAL ENCOUNTER (OUTPATIENT)
Dept: MRI IMAGING | Facility: HOSPITAL | Age: 35
Discharge: HOME | End: 2025-04-30
Attending: PHYSICIAN ASSISTANT
Payer: COMMERCIAL

## 2025-04-30 DIAGNOSIS — M25.531 RIGHT WRIST PAIN: ICD-10-CM

## 2025-04-30 PROCEDURE — 73221 MRI JOINT UPR EXTREM W/O DYE: CPT | Mod: RT

## 2025-05-01 ENCOUNTER — THERAPY VISIT (OUTPATIENT)
Dept: SPEECH THERAPY | Facility: CLINIC | Age: 35
End: 2025-05-01
Attending: FAMILY MEDICINE
Payer: COMMERCIAL

## 2025-05-01 DIAGNOSIS — F80.0 SPEECH ARTICULATION DISORDER: Primary | ICD-10-CM

## 2025-05-01 PROCEDURE — 92507 TX SP LANG VOICE COMM INDIV: CPT | Mod: GN

## 2025-05-15 ENCOUNTER — THERAPY VISIT (OUTPATIENT)
Dept: SPEECH THERAPY | Facility: CLINIC | Age: 35
End: 2025-05-15
Attending: FAMILY MEDICINE
Payer: COMMERCIAL

## 2025-05-15 DIAGNOSIS — F80.0 SPEECH ARTICULATION DISORDER: Primary | ICD-10-CM

## 2025-05-15 PROCEDURE — 92507 TX SP LANG VOICE COMM INDIV: CPT | Mod: GN

## 2025-06-03 ENCOUNTER — PATIENT OUTREACH (OUTPATIENT)
Dept: CARE COORDINATION | Facility: CLINIC | Age: 35
End: 2025-06-03
Payer: COMMERCIAL

## 2025-06-13 PROBLEM — R20.2 PARESTHESIAS: Status: RESOLVED | Noted: 2024-06-10 | Resolved: 2025-06-13

## 2025-06-13 PROBLEM — R12 HEART BURN: Status: RESOLVED | Noted: 2024-06-10 | Resolved: 2025-06-13

## 2025-06-13 PROBLEM — R43.2 DYSGEUSIA: Status: RESOLVED | Noted: 2024-06-10 | Resolved: 2025-06-13

## 2025-06-18 ENCOUNTER — THERAPY VISIT (OUTPATIENT)
Dept: SPEECH THERAPY | Facility: CLINIC | Age: 35
End: 2025-06-18
Attending: FAMILY MEDICINE
Payer: COMMERCIAL

## 2025-06-18 DIAGNOSIS — F80.0 SPEECH ARTICULATION DISORDER: Primary | ICD-10-CM

## 2025-06-18 PROCEDURE — 92507 TX SP LANG VOICE COMM INDIV: CPT | Mod: GN

## 2025-06-19 NOTE — PROGRESS NOTES
DISCHARGE  Reason for Discharge: Patient has met all goals.    Equipment Issued: none    Discharge Plan: Patient to continue home program.    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   SLP Goals   SLP Goals 1;2   SLP Goal 1   Goal Identifier Fluency   Goal Description Patient will utilize fluency enhancing strategies (e.g., easy onset, light articulatory contacts, slow rate, etc.) with minimal to no assistance to promote increased fluency to % in a variety of verbal communication tasks (including telephone, complex description, etc.)   Rationale To maximize the ability to communicate wants and needs within the home or community   Target Date 07/14/25   SLP Goal 2   Goal Identifier Motor Speech Strategies   Goal Description Patient will verbalize and utilize motor speech strategies with 90% accuracy and no cues at the multi-syllabic words, sentence, and conversation levels to promote increased articulatory accuracy for functional communication.   Rationale To maximize the ability to communicate wants and needs within the home or community   Target Date 07/14/25   Treatment Interventions (SLP)   Treatment Interventions Treatment Speech/Lang/Voice   Treatment Speech/Lang/Voice   Treatment of Speech, Language, Voice Communication&/or Auditory Processing (56293) 45 Minutes  "  Speech/Lang/Voice Speech/Lang/Voice 2   Speech/Lang/Voice 1 Fluency   Speech/Lang/Voice 1 - Details Overall, Pt reporting improvements in clarity of speech. Pt mentioned that it bothers her how much she uses filler words (um, like) and she notices this most during job interviews. SLP re-educated Pt on using intentional pauses in her speech to minimize this happening and slow her overall rate. Also discussed how her anxiety and nerves during an interview will impact her communication and using note cards, etc. to have more planned out speech may assist in reducing breakdowns. SLP educated Pt on continued use of recording herself when practicing her interview speech for auditory feedback of her rate of speech and to evaluate how much to slow down (using a scale of 1-10) and finding her \"sweet spot\".   Speech/Lang/Voice 2 Motor Speech Strategies   Speech/Lang/Voice 2 - Details Overall, Pt reporting improvements in clarity of speech. Pt mentioned that it bothers her how much she uses filler words (um, like) and she notices this most during job interviews. SLP re-educated Pt on using intentional pauses  in her speech to minimize this happening and slow her overall rate. Also discussed how her anxiety and nerves during an interview will impact her communication and using note cards, etc. to have more planned out speech may assist in reducing breakdowns. SLP educated Pt on continued use of recording herself when practicing her interview speech for auditory feedback of her rate of speech and to evaluate how much to slow down (using a scale of 1-10) and finding her \"sweet spot\".   Skilled Intervention Provided written and verbal information on.;Modeled compensatory strategies;Provided feedback on performance of tasks   Patient Response/Progress Pt receptive and tolerated. Pt reporting an improvement overall in speech intelligibility. Pt agreeable to discharge and continue HEP for continued application of strategies into " structured and unstructured conversation tasks.   Education   Learner/Method Patient;Listening;No Barriers to Learning   Education Comments Session targets, performance, rationale behind therapy exercises, HEP   Plan   Home program discharge   Plan for next session discharge   Total Session Time   Total Treatment Time (sum of timed and untimed services) 45

## 2025-06-23 ENCOUNTER — THERAPY VISIT (OUTPATIENT)
Dept: OCCUPATIONAL THERAPY | Facility: REHABILITATION | Age: 35
End: 2025-06-23
Attending: PHYSICIAN ASSISTANT
Payer: COMMERCIAL

## 2025-06-23 DIAGNOSIS — M25.531 RIGHT WRIST PAIN: Primary | ICD-10-CM

## 2025-06-23 DIAGNOSIS — M25.531 RIGHT WRIST PAIN: ICD-10-CM

## 2025-06-23 PROCEDURE — 97110 THERAPEUTIC EXERCISES: CPT | Mod: GO | Performed by: OCCUPATIONAL THERAPIST

## 2025-06-23 PROCEDURE — 97530 THERAPEUTIC ACTIVITIES: CPT | Mod: GO | Performed by: OCCUPATIONAL THERAPIST

## 2025-06-23 PROCEDURE — 97165 OT EVAL LOW COMPLEX 30 MIN: CPT | Mod: GO | Performed by: OCCUPATIONAL THERAPIST

## 2025-06-23 PROCEDURE — 97760 ORTHOTIC MGMT&TRAING 1ST ENC: CPT | Mod: GO | Performed by: OCCUPATIONAL THERAPIST

## 2025-06-23 NOTE — PROGRESS NOTES
OCCUPATIONAL THERAPY EVALUATION  Type of Visit: Evaluation       Fall Risk Screen:  Have you fallen 2 or more times in the past year?: No  Have you fallen and had an injury in the past year?: No    Subjective        Presenting condition or subjective complaint: wrist injury  Date of onset: 05/02/25 (Referral)    Relevant medical history:     Dates & types of surgery: non    Patient presents to therapy today for evaluation and treatment of right-sided wrist pain.  She is known to me in hand therapy for treatment of similar complaints last year. About 2 months ago patient reports she was lifting a heavy object from her car, twisting the wrist, afterwards endorsing severe discomfort to the right, ulnar side of the wrist. Symptoms gradually subsided slightly, though she still endorses difficulty lifting heavy objects and rotating the forearm. She still wears a wrist brace at night, occasionally at daytime to prevent injury. She still periodically applies voltaren sparingly with good results.     Per Javi Gimenez PA-C 4/17/2025:     - We discussed the patient sustained an acute right wrist injury yesterday lifting a bottle of water and placing it in her car causing pain over the distal ulnar styloid process region.  Patient has utilized topical Voltaren as well as reinitiated using her Velcro wrist brace.  She denies numbness or tingling.  - We discussed that this is the same region that she had previously injured and had pain back in November 2024 which was treated conservatively with 2 sessions of occupational therapy, over-the-counter wrist bracing and topical Voltaren with good response and recovery.  - Due to acute on chronic wrist pain we agreed to proceed with right wrist MRI to evaluate the TFCC region for possible soft tissue injury/ligament tear.  I will contact the patient via telephone once results of MRI are known to discuss further treatment.  - Patient in the meantime we will continue with her Velcro wrist  brace for protection and wearing it at all times except for skin care  - Patient will be started on meloxicam 7.5 mg tablet to be taken 1 tablet daily after a meal to help decrease inflammation and pain  - Patient will continue with topical Voltaren gel to her wrist to help with inflammation and pain as well as should utilize topical ice    MR Impression 4/30/2025 (right wrist):    1.  Intact wrist tendons and ligaments.  2.  Subcentimeter ganglion cyst at the volar aspect of the distal radius.  3.  No acute osseous abnormality.  4.  Intact triangular fibrocartilage.      Prior diagnostic imaging/testing results: MRI; CT scan     Prior therapy history for the same diagnosis, illness or injury: Yes forgot    Prior Level of Function  Transfers: Independent  Ambulation: Independent  ADL: Independent  IADL: Driving, Finances, Housekeeping, Laundry, Meal preparation, Medication management, Work, Yard work    Living Environment  Social support: With a significant other or spouse   Type of home: House; [facility]   Stairs to enter the home: No       Ramp: No   Stairs inside the home: Yes 18 Is there a railing: Yes     Help at home: Home management tasks (cooking, cleaning)  Equipment owned:       Employment: No    Hobbies/Interests: reading and photography    Patient goals for therapy: lift slightly heavy objects       Objective   ADDITIONAL HISTORY:  Right hand dominant  Patient reports symptoms of pain, stiffness/loss of motion, weakness/loss of strength, and edema  Transportation: drives  Currently not working, job searching    Functional Outcome Measure:   Upper Extremity Functional Index Score:  SCORE:   Column Totals: /80: (Patient-Rptd) 67   (A lower score indicates greater disability.)    PAIN:  Pain Level at Rest: 0/10  Pain Level with Use: 5/10  Pain Location: Right ulnar wrist, patient references more proximal from the ulnar styloid and fovea   Pain Quality: Aching, Sharp, and sensitive.  Pain Frequency:  intermittent  Pain is Worst: daytime  Pain is Exacerbated By: Twisting and deviating the wrist/forearm, heavy lifting  Pain is Relieved By: NSAIDs, rest, and orthotic wear.  Pain Progression: Improved    Edema (Circumference measured in cm)    12/4/2024 12/4/2024     L R   DWC 13.8 14.0        Sensation   WNL throughout all nerve distributions; per patient report     ROM      ROM  Pain Report: - none  + mild    ++ moderate    +++ severe   Wrist 6/23/2025 6/23/2025   AROM (PROM) L R   Extension 67 58   Flexion 55 50   RD 17 10   UD 35 25, +   Supination 85 63   Pronation WNL WNL      - Active right wrist simultaneous supination, flexion and ulnar deviation is exquisitely painful.    Pain Report: - none  + mild    ++ moderate    +++ severe   Wrist 6/23/2025 6/23/2025   AROM (PROM) L R   Extension   -   Flexion   -   RD   -   UD   ++   Supination   ++   Pronation   -      Tenderness: Pain level report on scale 0-10/10  WRIST PALPATION:  Date 12/4/2024 6/23/2025   Side R Non-tender   Ulna Styloid 0 Non-tender   TFCC 0 Non-tender   DRUJ 0 Non-tender      Ulnar Dorsal Zone: Pain Report: - none  + mild    ++ moderate    +++ severe     12/4/2024 6/23/2025   SIDE R L   Radiocarpal P/S (TFCC--stab f/a, rotate with some compression) - Negative   Ballottement II P/S (TFCC--stab hand/wrist, pt p/s) ++ Negative   TFCC load Test (UD, axially load wrist c volar/dorsal mvmt) + Negative     Assessment & Plan   CLINICAL IMPRESSIONS  Medical Diagnosis: Right wrist pain    Treatment Diagnosis: Right wrist pain    Impression/Assessment: Pt is a 35 year old female presenting to Occupational Therapy due to right wrist pain.  The following significant findings have been identified: Impaired activity tolerance, Impaired coordination, Impaired ROM, Impaired strength, and Pain.  These identified deficits interfere with their ability to perform self care tasks, work tasks, recreational activities, household chores, driving , medication  management, financial management,  yard work, care of others, and meal planning and preparation as compared to previous level of function.   Patient's limitations or Problem List includes: Pain, Decreased ROM/motion, Increased edema, Weakness, Hypomobility, Decreased , Decreased pinch, Decreased coordination, Decreased dexterity, and Tightness in musculature of the right elbow, wrist, and hand which interferes with the patient's ability to perform Self Care Tasks (dressing, eating, bathing, hygiene/toileting), Work Tasks, Sleep Patterns, Recreational Activities, Household Chores, and Driving  as compared to previous level of function.    Clinical Decision Making (Complexity):  Assessment of Occupational Performance: 3-5 Performance Deficits  Occupational Performance Limitations: bathing/showering, toileting, dressing, feeding, hygiene and grooming, care of others, communication management, driving and community mobility, health management and maintenance, home establishment and management, meal preparation and cleanup, shopping, sleep, work, leisure activities, and social participation  Clinical Decision Making (Complexity): Low complexity    PLAN OF CARE  Treatment Interventions:  Modalities:  US  Therapeutic Exercise:  AROM, AAROM, PROM, Tendon Gliding, Blocking, Reverse Blocking, Place and Hold, Contract Relax, Extensor Tracking, Isotonics, Isometrics, and Stabilization  Neuromuscular re-education:  Nerve Gliding, Coordination/Dexterity, Kinesthetic Training, Proprioceptive Training, Posture, Kinesiotaping, Strain Counter Strain, Isometrics, and Stabilization  Manual Techniques:  Coordination/Dexterity, Joint mobilization, Friction massage, Myofascial release, and Manual edema mobilization  Orthotic Fabrication:  Static and Forearm based  Self Care:  Self Care Tasks, Ergonomic Considerations, Community Transportation, and Work Tasks    Long Term Goals   OT Goal 1  Goal Identifier: Goal I  Goal Description:  Patient will exhibit at least 62 degrees of active right wrist extension without pain or difficulty.  Rationale: In order to maximize safety and independence with performance of self-care activities;In order to maximize safety and independence with ADL/IADLs  Goal Progress: New  Target Date: 08/18/25  OT Goal 2  Goal Identifier: Goal I  Goal Description: Patient will exhibit at least 70 degrees of active right forearm supination without pain or difficulty.  Rationale: In order to maximize safety and independence with performance of self-care activities;In order to maximize safety and independence with ADL/IADLs  Goal Progress: New  Target Date: 08/18/25      Frequency of Treatment: 1x/week  Duration of Treatment: 8 weeks     Education Assessment: Learner/Method: Patient;Pictures/Video;Demonstration;Reading;Listening     Risks and benefits of evaluation/treatment have been explained.   Patient/Family/caregiver agrees with Plan of Care.     Evaluation Time:    OT Eval, Low Complexity Minutes (15624): 20    Signing Clinician: Elvin Chadwick OT        [facility]                                                                                   OUTPATIENT OCCUPATIONAL THERAPY      PLAN OF TREATMENT FOR OUTPATIENT REHABILITATION   Patient's Last Name, First Name, Sandi Avila    YOB: 1990   Provider's Name   [facility]   Medical Record No.  [id number]     Onset Date: 05/02/25 (Referral) Start of Care Date: 06/23/25     Medical Diagnosis:  Right wrist pain      OT Treatment Diagnosis:  Right wrist pain Plan of Treatment  Frequency/Duration:1x/week/8 weeks    Certification date from 06/23/25   To 08/18/25        See note for plan of treatment details and functional goals     [facility] CERTIFY THE NEED FOR THESE SERVICES FURNISHED UNDER        THIS PLAN OF TREATMENT AND WHILE UNDER MY CARE     (Physician  attestation of this document indicates review and certification of the therapy plan).              Referring Provider:  Javi Gimenez    Initial Assessment  See Epic Evaluation- 06/23/25

## 2025-07-02 ENCOUNTER — THERAPY VISIT (OUTPATIENT)
Dept: OCCUPATIONAL THERAPY | Facility: REHABILITATION | Age: 35
End: 2025-07-02
Payer: COMMERCIAL

## 2025-07-02 DIAGNOSIS — M25.531 RIGHT WRIST PAIN: Primary | ICD-10-CM

## 2025-07-02 PROCEDURE — 97110 THERAPEUTIC EXERCISES: CPT | Mod: GO

## 2025-07-02 PROCEDURE — 97035 APP MDLTY 1+ULTRASOUND EA 15: CPT | Mod: GO

## 2025-07-02 PROCEDURE — 97535 SELF CARE MNGMENT TRAINING: CPT | Mod: GO

## 2025-07-09 ENCOUNTER — THERAPY VISIT (OUTPATIENT)
Dept: OCCUPATIONAL THERAPY | Facility: REHABILITATION | Age: 35
End: 2025-07-09
Payer: COMMERCIAL

## 2025-07-09 DIAGNOSIS — M25.531 RIGHT WRIST PAIN: Primary | ICD-10-CM

## 2025-07-09 PROCEDURE — 97035 APP MDLTY 1+ULTRASOUND EA 15: CPT | Mod: GO

## 2025-07-09 PROCEDURE — 97110 THERAPEUTIC EXERCISES: CPT | Mod: GO

## 2025-07-09 PROCEDURE — 97140 MANUAL THERAPY 1/> REGIONS: CPT | Mod: GO

## 2025-07-09 NOTE — PROGRESS NOTES
ROM (06/23/2025)  Pain Report: - none  + mild    ++ moderate    +++ severe   Wrist 6/23/2025 6/23/2025   AROM (PROM) L R   Extension 67 58   Flexion 55 50   RD 17 10   UD 35 25, +   Supination 85 63   Pronation WNL WNL     ROM (07/09/25)  Pain Report: - none  + mild    ++ moderate    +++ severe   Wrist 6/23/2025   AROM (PROM) Right (post-tx)   Extension 70 (70)   Flexion 75 (76)   RD 15   UD 25   Supination 80 (85)   Pronation 90     Strength   (Measured in pounds)  Pain Report: - none  + mild    ++ moderate    +++ severe    7/9/2025 7/9/2025   Trials Right Left   1 28 35

## 2025-07-24 ENCOUNTER — THERAPY VISIT (OUTPATIENT)
Dept: OCCUPATIONAL THERAPY | Facility: REHABILITATION | Age: 35
End: 2025-07-24
Payer: COMMERCIAL

## 2025-07-24 DIAGNOSIS — M25.531 RIGHT WRIST PAIN: Primary | ICD-10-CM

## 2025-08-21 ENCOUNTER — THERAPY VISIT (OUTPATIENT)
Dept: OCCUPATIONAL THERAPY | Facility: REHABILITATION | Age: 35
End: 2025-08-21
Payer: COMMERCIAL

## 2025-08-21 DIAGNOSIS — M25.531 RIGHT WRIST PAIN: Primary | ICD-10-CM
